# Patient Record
Sex: FEMALE | Race: WHITE | NOT HISPANIC OR LATINO | Employment: PART TIME | URBAN - METROPOLITAN AREA
[De-identification: names, ages, dates, MRNs, and addresses within clinical notes are randomized per-mention and may not be internally consistent; named-entity substitution may affect disease eponyms.]

---

## 2018-01-24 ENCOUNTER — OFFICE VISIT CONVERTED (OUTPATIENT)
Dept: ORTHOPEDIC SURGERY | Facility: CLINIC | Age: 53
End: 2018-01-24
Attending: PHYSICIAN ASSISTANT

## 2018-02-28 ENCOUNTER — OFFICE VISIT CONVERTED (OUTPATIENT)
Dept: ORTHOPEDIC SURGERY | Facility: CLINIC | Age: 53
End: 2018-02-28
Attending: PHYSICIAN ASSISTANT

## 2018-04-25 ENCOUNTER — OFFICE VISIT CONVERTED (OUTPATIENT)
Dept: ORTHOPEDIC SURGERY | Facility: CLINIC | Age: 53
End: 2018-04-25
Attending: PHYSICIAN ASSISTANT

## 2018-06-04 ENCOUNTER — OFFICE VISIT CONVERTED (OUTPATIENT)
Dept: ORTHOPEDIC SURGERY | Facility: CLINIC | Age: 53
End: 2018-06-04
Attending: ORTHOPAEDIC SURGERY

## 2018-07-27 ENCOUNTER — OFFICE VISIT CONVERTED (OUTPATIENT)
Dept: ORTHOPEDIC SURGERY | Facility: CLINIC | Age: 53
End: 2018-07-27
Attending: PHYSICIAN ASSISTANT

## 2018-10-01 ENCOUNTER — OFFICE VISIT CONVERTED (OUTPATIENT)
Dept: ORTHOPEDIC SURGERY | Facility: CLINIC | Age: 53
End: 2018-10-01
Attending: PHYSICIAN ASSISTANT

## 2020-03-26 ENCOUNTER — HOSPITAL ENCOUNTER (OUTPATIENT)
Dept: GENERAL RADIOLOGY | Facility: HOSPITAL | Age: 55
Discharge: HOME | End: 2020-03-26
Attending: NURSE PRACTITIONER

## 2020-12-23 ENCOUNTER — OFFICE VISIT CONVERTED (OUTPATIENT)
Dept: ORTHOPEDIC SURGERY | Facility: CLINIC | Age: 55
End: 2020-12-23
Attending: PHYSICIAN ASSISTANT

## 2021-01-20 ENCOUNTER — OFFICE VISIT CONVERTED (OUTPATIENT)
Dept: ORTHOPEDIC SURGERY | Facility: CLINIC | Age: 56
End: 2021-01-20
Attending: ORTHOPAEDIC SURGERY

## 2021-02-13 ENCOUNTER — HOSPITAL ENCOUNTER (OUTPATIENT)
Dept: PREADMISSION TESTING | Facility: HOSPITAL | Age: 56
Discharge: HOME | End: 2021-02-13
Attending: ORTHOPAEDIC SURGERY

## 2021-02-14 LAB — SARS-COV-2 RNA SPEC QL NAA+PROBE: DETECTED

## 2021-03-11 ENCOUNTER — HOSPITAL ENCOUNTER (OUTPATIENT)
Dept: PERIOP | Facility: HOSPITAL | Age: 56
Setting detail: HOSPITAL OUTPATIENT SURGERY
Discharge: HOME | End: 2021-03-11
Attending: ORTHOPAEDIC SURGERY

## 2021-03-11 LAB
GLUCOSE BLD-MCNC: 100 MG/DL (ref 65–99)
GLUCOSE BLD-MCNC: 121 MG/DL (ref 65–99)

## 2021-03-24 ENCOUNTER — OFFICE VISIT CONVERTED (OUTPATIENT)
Dept: ORTHOPEDIC SURGERY | Facility: CLINIC | Age: 56
End: 2021-03-24

## 2021-04-26 ENCOUNTER — OFFICE VISIT CONVERTED (OUTPATIENT)
Dept: ORTHOPEDIC SURGERY | Facility: CLINIC | Age: 56
End: 2021-04-26
Attending: PHYSICIAN ASSISTANT

## 2021-05-10 NOTE — H&P
History and Physical      Patient Name: Ann Marie Bunch   Patient ID: [id number]   Sex: Female   YOB: 1965        Visit Date: December 23, 2020    Provider: Claire Zuñiga PA-C   Location: [facility]   Location Address: [address] [place] [address]  [id number]   Location Phone: [phone]          Chief Complaint  · Right knee pain      History Of Present Illness  Ann Marie Bunch is a 55 year old /White female who presents today to [facility].      Patient is here for low back pain, right lower leg posterior pain. Patient states pain in the posterior aspect of the knee radiating to the right calf. Patient states pain started 3-4 weeks ago. Patient states pain is severe at times. Patient states pain with putting pressure on right leg. Patient denies recent injury or trauma.       Past Medical History  Aftercare following right shoulder arthroscopy; Asthma; Chronic Obstructive Pulmonary Disease; Diabetes         Past Surgical History  Back; Tonsillectomy         Medication List  Advair Diskus 100-50 mcg/dose inhalation blister with device; baclofen 10 mg oral tablet; loratadine 10 mg oral tablet; methscopolamine 2.5 mg oral tablet; Sudafed 12 Hour 120 mg oral tablet extended release         Allergy List  NSAIDS         Family Medical History  Cancer, Unspecified; Diabetes, unspecified type; Family history of certain chronic disabling diseases; arthritis         Social History  Alcohol Use (Current some day); .; lives alone; Recreational Drug Use (Never); Tobacco (Former); Working         Review of Systems  · Constitutional  o Denies  o : fever, chills, weight loss  · Cardiovascular  o Denies  o : chest pain, shortness of breath  · Gastrointestinal  o Denies  o : liver disease, heartburn, nausea, blood in stools  · Genitourinary  o Denies  o : painful urination, blood in urine  · Integument  o Denies  o : rash, itching  · Neurologic  o Denies  o : headache,  "weakness, loss of consciousness  · Musculoskeletal  o Denies  o : painful, swollen joints  · Psychiatric  o Denies  o : drug/alcohol addiction, anxiety, depression      Vitals  Date Time BP Position Site L\R Cuff Size HR RR TEMP (F) WT  HT  BMI kg/m2 BSA m2 O2 Sat FR L/min FiO2        12/23/2020 09:58 AM      89 - R   285lbs 0oz 5'  3\" 50.48 2.4 95 %            Physical Examination  · Constitutional  o Appearance  o : well developed, well-nourished, no obvious deformities present  · Head and Face  o Head  o :   § Inspection  § : normocephalic  o Face  o :   § Inspection  § : no facial lesions  · Eyes  o Conjunctivae  o : conjunctivae normal  o Sclerae  o : sclerae white  · Ears, Nose, Mouth and Throat  o Ears  o :   § External Ears  § : appearance within normal limits  § Hearing  § : intact  o Nose  o :   § External Nose  § : appearance normal  · Neck  o Inspection/Palpation  o : normal appearance  o Range of Motion  o : full range of motion  · Respiratory  o Respiratory Effort  o : breathing unlabored  o Inspection of Chest  o : normal appearance  o Auscultation of Lungs  o : no audible wheezing or rales  · Cardiovascular  o Heart  o : regular rate  · Gastrointestinal  o Abdominal Examination  o : soft and non-tender  · Skin and Subcutaneous Tissue  o General Inspection  o : intact, no rashes  · Psychiatric  o General  o : Alert and oriented x3  o Judgement and Insight  o : judgment and insight intact  o Mood and Affect  o : mood normal, affect appropriate  · Back  o Inspection  o : Pain distribution of L4-L5  · Injection Note/Aspiration Note  o Site  o : IM  o Procedure  o : Procedure: After educating the patient, patient gave consent for the procedure. After using alcohol prep, injection was given. The patient tolerated the procedure well.   o Medication  o : 2ml's of 4 mg Dexamethasone  · In Office Procedures  o View  o : LAT/SUNRISE/STANDING  o Site  o : right, knee  o Indication  o : Right knee " pain  o Study  o : X-rays ordered, taken in the office, and reviewed today.  o Xray  o : negative fracture or dislocation   o Comparative Data  o : No comparative data found  · Right Knee-Street  o Inspection  o : no limping gait, weight bearing, no swelling, no ecchymosis, no atrophy, neutral alignment  o Palpation  o : no medial joint line tenderness, no lateral joint line tenderness, no patellar tendon tenderness, no pain of MCL, no pain at LCL  o ROM  o : full extension, full flexion  o Strength  o : full extension, full flexion  o Neurovascular  o : Full sensation, Dorsal Pedal Pulse 2+, posteriror tibialis pulse 2+          Assessment  · Low Back Pain     724.2/M54.5  · Right knee pain, unspecified chronicity     719.46/M25.561      Plan  · Orders  o 2.00 - Dexamethasone Injection 8mg (-4) - 719.46/M25.561 - 12/23/2020   Lot [id number] Exp 01 2021 [facility] Administered by [employer]  o IM - Injection Fee [facility] ([id number]) - 719.46/M25.561 - 12/23/2020   Administered by [employer] MA  o Knee (Right) [facility] ([id number]) - 719.46/M25.561 - 12/23/2020  · Medications  o Medications have been Reconciled  o Transition of Care or Provider Policy  · Instructions  o Reviewed the patient's Past Medical, Social, and Family history as well as the ROS at today's visit, no changes.  o Call or return if worsening symptoms.  o Electronically Identified Patient Education Materials Provided Electronically     Recommend following up with Neurosurgeon               Electronically Signed by: RICCARDO Land-WASHINGTON -Author on December 23, 2020 10:33:16 AM  Electronically Co-signed by: Kun Clark MD -Reviewer on December 24, 2020 08:29:18 PM

## 2021-05-14 VITALS — OXYGEN SATURATION: 96 % | BODY MASS INDEX: 49.26 KG/M2 | WEIGHT: 278 LBS | HEIGHT: 63 IN | HEART RATE: 81 BPM

## 2021-05-14 VITALS — WEIGHT: 285 LBS | HEIGHT: 63 IN | HEART RATE: 89 BPM | BODY MASS INDEX: 50.5 KG/M2 | OXYGEN SATURATION: 95 %

## 2021-05-14 VITALS — HEIGHT: 63 IN | BODY MASS INDEX: 49.61 KG/M2 | WEIGHT: 280 LBS

## 2021-05-14 VITALS — BODY MASS INDEX: 48.93 KG/M2 | OXYGEN SATURATION: 96 % | HEART RATE: 79 BPM | WEIGHT: 276.12 LBS | HEIGHT: 63 IN

## 2021-05-14 NOTE — PROGRESS NOTES
Progress Note      Patient Name: Ann Marie Bunch   Patient ID: [id number]   Sex: Female   YOB: 1965        Visit Date: March 24, 2021    Provider: Denzel Monroy PA-C   Location: [facility]   Location Address: [address] [place] [address]  [id number]   Location Phone: [phone]          Chief Complaint  · Right knee pain      History Of Present Illness  Ann Marie Bunch is a 56 year old /White female who presents today to [facility].      Patient follows up today for right knee arthroscopy with partial medial meniscectomy chondroplasty and removal of multiple loose bodies performed 3/11/2021 by Dr. Narayan.  Patient reports pain has improved since the time of surgery.  Patient has been adherent to interventions and instructions.       Past Medical History  Aftercare following right shoulder arthroscopy; Asthma; Chronic Obstructive Pulmonary Disease; Diabetes; Limb Swelling         Past Surgical History  Back; Joint Surgery; Tonsillectomy         Medication List  Advair Diskus 100-50 mcg/dose inhalation blister with device; loratadine 10 mg oral tablet; methscopolamine 2.5 mg oral tablet; Sudafed 12 Hour 120 mg oral tablet extended release         Allergy List  NSAIDS         Family Medical History  Cancer, Unspecified; Diabetes, unspecified type; Family history of certain chronic disabling diseases; arthritis         Social History  Alcohol Use (Current some day); .; lives alone; Recreational Drug Use (Never); Tobacco (Former); Working         Review of Systems  · Constitutional  o Denies  o : fever, chills, weight loss  · Cardiovascular  o Denies  o : chest pain, shortness of breath  · Gastrointestinal  o Denies  o : liver disease, heartburn, nausea, blood in stools  · Genitourinary  o Denies  o : painful urination, blood in urine  · Integument  o Denies  o : rash, itching  · Neurologic  o Denies  o : headache, weakness, loss of  "consciousness  · Musculoskeletal  o Denies  o : painful, swollen joints  · Psychiatric  o Denies  o : drug/alcohol addiction, anxiety, depression      Vitals  Date Time BP Position Site L\R Cuff Size HR RR TEMP (F) WT  HT  BMI kg/m2 BSA m2 O2 Sat FR L/min FiO2 HC       03/24/2021 10:18 AM      79 - R   276lbs 2oz 5'  3\" 48.91 2.36 96 %            Physical Examination  · Constitutional  o Appearance  o : well developed, well-nourished, no obvious deformities present  · Head and Face  o Head  o :   § Inspection  § : normocephalic  o Face  o :   § Inspection  § : no facial lesions  · Eyes  o Conjunctivae  o : conjunctivae normal  o Sclerae  o : sclerae white  · Ears, Nose, Mouth and Throat  o Ears  o :   § External Ears  § : appearance within normal limits  § Hearing  § : intact  o Nose  o :   § External Nose  § : appearance normal  · Neck  o Inspection/Palpation  o : normal appearance  o Range of Motion  o : full range of motion  · Respiratory  o Respiratory Effort  o : breathing unlabored  o Inspection of Chest  o : normal appearance  o Auscultation of Lungs  o : no audible wheezing or rales  · Cardiovascular  o Heart  o : regular rate  · Gastrointestinal  o Abdominal Examination  o : soft and non-tender  · Skin and Subcutaneous Tissue  o General Inspection  o : intact, no rashes  · Psychiatric  o General  o : Alert and oriented x3  o Judgement and Insight  o : judgment and insight intact  o Mood and Affect  o : mood normal, affect appropriate  · Right Knee  o Inspection  o : Surgical sites appreciated to be healing appropriately and are clean dry and intact. Range of motion is moderately decreased as expected. 5 degrees extension to 100 degrees flexion. Antalgic and stable gait favoring right.          Assessment  · Right knee pain, unspecified chronicity     719.46/M25.561  · Surgical aftercare, musculoskeletal system     V58.78/Z47.89  · Decreased range of motion (ROM) of right " knee     719.56/M25.661      Plan  · Instructions  o Reviewed the patient's Past Medical, Social, and Family history as well as the ROS at today's visit, no changes.  o Call or return if worsening symptoms.  o Patient is 2 weeks post op. Sutures/staples removed. Should continue physical therapy and is to follow up 4 weeks.  o . Portions of this note were generated with voice recognition software. While efforts have been made to proofread the text, some sound alike errors may still persist.             Electronically Signed by: RICCARDO Valverde-WASHINGTON -Author on March 24, 2021 01:02:54 PM  Electronically Co-signed by: Kun Clark MD -Reviewer on March 24, 2021 09:18:47 PM

## 2021-05-14 NOTE — PROGRESS NOTES
Progress Note      Patient Name: Ann Marie Bunch   Patient ID: [id number]   Sex: Female   YOB: 1965        Visit Date: January 20, 2021    Provider: Kun Clark MD   Location: [facility]   Location Address: [address] [place] [address]  [id number]   Location Phone: [phone]          Chief Complaint  · Right Knee Pain      History Of Present Illness  Ann Marie Bunch is a 55 year old /White female who presents today to [facility].      Patient presents today with a follow-up of right knee pain. Patient states she has posterior knee pain that is radiating into her calf. She states pain has been ongoing for several weeks. She states pain can be severe at times. She received an injection last visit by one of our PAs that gave her minimal relief. Any pressure on her right knee causes increasing pain. Patient denies any trauma or injury. She states she does have some swelling on the posterior aspect of her knee and into her calf. Patient states that she is currently attending physical therapy. Patient presents today with MRI results of her right knee.       Past Medical History  Aftercare following right shoulder arthroscopy; Asthma; Chronic Obstructive Pulmonary Disease; Diabetes; Limb Swelling         Past Surgical History  Back; Joint Surgery; Tonsillectomy         Medication List  Advair Diskus 100-50 mcg/dose inhalation blister with device; loratadine 10 mg oral tablet; methscopolamine 2.5 mg oral tablet; Sudafed 12 Hour 120 mg oral tablet extended release         Allergy List  NSAIDS       Allergies Reconciled  Family Medical History  Cancer, Unspecified; Diabetes, unspecified type; Family history of certain chronic disabling diseases; arthritis         Social History  Alcohol Use (Current some day); .; lives alone; Recreational Drug Use (Never); Tobacco (Former); Working         Review of Systems  · Constitutional  o Denies  o : fever, chills, weight  "loss  · Cardiovascular  o Denies  o : chest pain, shortness of breath  · Gastrointestinal  o Denies  o : liver disease, heartburn, nausea, blood in stools  · Genitourinary  o Denies  o : painful urination, blood in urine  · Integument  o Denies  o : rash, itching  · Neurologic  o Denies  o : headache, weakness, loss of consciousness  · Musculoskeletal  o Denies  o : painful, swollen joints  · Psychiatric  o Denies  o : drug/alcohol addiction, anxiety, depression      Vitals  Date Time BP Position Site L\R Cuff Size HR RR TEMP (F) WT  HT  BMI kg/m2 BSA m2 O2 Sat FR L/min FiO2 HC       01/20/2021 10:24 AM      81 - R   278lbs 0oz 5'  3\" 49.24 2.37 96 %            Physical Examination  · Constitutional  o Appearance  o : well developed, well-nourished, no obvious deformities present  · Head and Face  o Head  o :   § Inspection  § : normocephalic  o Face  o :   § Inspection  § : no facial lesions  · Eyes  o Conjunctivae  o : conjunctivae normal  o Sclerae  o : sclerae white  · Ears, Nose, Mouth and Throat  o Ears  o :   § External Ears  § : appearance within normal limits  § Hearing  § : intact  o Nose  o :   § External Nose  § : appearance normal  · Neck  o Inspection/Palpation  o : normal appearance  o Range of Motion  o : full range of motion  · Respiratory  o Respiratory Effort  o : breathing unlabored  o Inspection of Chest  o : normal appearance  o Auscultation of Lungs  o : no audible wheezing or rales  · Cardiovascular  o Heart  o : regular rate  · Gastrointestinal  o Abdominal Examination  o : soft and non-tender  · Skin and Subcutaneous Tissue  o General Inspection  o : intact, no rashes  · Psychiatric  o General  o : Alert and oriented x3  o Judgement and Insight  o : judgment and insight intact  o Mood and Affect  o : mood normal, affect appropriate  · Right Knee  o Inspection  o : Sensation grossly intact. Neurovascular intact. Skin intact. Calf supple, non-tender. Patella tendon tenderness. Pain with " flexion. Tender medial joint line. Non-tender lateral joint line. Flexion to 90 degrees secondary to pain. Near full extension. No swelling, skin discoloration or atrophy. Pain with Apley's. Hamstring tenderness. Full weight bearing. Limping gait.   · Imaging  o Imaging  o : 1/5/21 MRI: 1. Findings most suspicious for a tear at the root of the posterior horn medial meniscus, or less likely extensive intrasubstance degenerative signal. There is also small region of marrow edema in the adjacent posterior medial tibial plateau suggesting an area of bone contusion. Intrasubstance degenerative changes are also noted in the remainder of the medial meniscus with fraying or surface tearing along the free edge of the body. 2. Moderate patellofemoeral joint degenerative changes and chondromalcia. There is mild medial compartment degenerative changes. 3. Thickening and mild signal changes in the proximal medial collateral ligament suggesting a mild sprain with no evidence of disruption. 4. Small joint effusion.           Assessment  · Right Knee MMT (medial meniscus tear)     836.0/S83.249A  · Right knee pain, unspecified chronicity     719.46/M25.561      Plan  · Medications  o Medications have been Reconciled  o Transition of Care or Provider Policy  · Instructions  o Dr. Clark saw and examined the patient and agrees with plan.   o MRI reviewed by Dr. Clark.  o Reviewed the patient's Past Medical, Social, and Family history as well as the ROS at today's visit, no changes.  o Call or return if worsening symptoms.  o Discussed surgery.  o Risks/benefits discussed with patient including, but not limited to: infection, bleeding, neurovascular damage, malunion, nonunion, aesthetic deformity, need for further surgery, and death.  o Surgery pamphlet given.  o Follow Up Post-Op.  o This note was transcribed by Charmaine Azevedo. fabi  o Discussed diagnosis and treatment options with the patient. Patient wishes to proceed with a right knee  scope.  o Electronically Identified Patient Education Materials Provided Electronically            Electronically Signed by: Charmaine Azevedo-Beatrice, Other -Author on January 21, 2021 02:33:58 PM  Electronically Co-signed by: Kun Clark MD -Reviewer on January 24, 2021 07:38:42 PM

## 2021-05-16 VITALS — OXYGEN SATURATION: 97 % | HEIGHT: 63 IN | WEIGHT: 276.12 LBS | HEART RATE: 81 BPM | BODY MASS INDEX: 48.93 KG/M2

## 2021-05-16 VITALS — HEIGHT: 63 IN | BODY MASS INDEX: 49.61 KG/M2 | WEIGHT: 280 LBS | OXYGEN SATURATION: 97 % | HEART RATE: 88 BPM

## 2021-05-16 VITALS — HEIGHT: 63 IN | BODY MASS INDEX: 48.21 KG/M2 | OXYGEN SATURATION: 98 % | WEIGHT: 272.12 LBS | HEART RATE: 100 BPM

## 2021-05-16 VITALS — HEART RATE: 67 BPM | WEIGHT: 280 LBS | OXYGEN SATURATION: 96 % | BODY MASS INDEX: 49.61 KG/M2 | HEIGHT: 63 IN

## 2021-05-16 VITALS — WEIGHT: 265 LBS | HEART RATE: 84 BPM | BODY MASS INDEX: 46.95 KG/M2 | HEIGHT: 63 IN | OXYGEN SATURATION: 97 %

## 2021-05-16 VITALS — HEIGHT: 63 IN | WEIGHT: 276 LBS | BODY MASS INDEX: 48.9 KG/M2 | OXYGEN SATURATION: 97 % | HEART RATE: 76 BPM

## 2021-06-07 ENCOUNTER — OFFICE VISIT (OUTPATIENT)
Dept: ORTHOPEDIC SURGERY | Facility: CLINIC | Age: 56
End: 2021-06-07

## 2021-06-07 VITALS — BODY MASS INDEX: 48.73 KG/M2 | HEART RATE: 82 BPM | HEIGHT: 63 IN | WEIGHT: 275 LBS | OXYGEN SATURATION: 96 %

## 2021-06-07 DIAGNOSIS — Z98.890 S/P ARTHROSCOPY OF RIGHT KNEE: Primary | ICD-10-CM

## 2021-06-07 PROCEDURE — 99024 POSTOP FOLLOW-UP VISIT: CPT | Performed by: PHYSICIAN ASSISTANT

## 2021-06-07 RX ORDER — BENZONATATE 200 MG/1
200 CAPSULE ORAL
COMMUNITY
Start: 2020-12-11

## 2021-06-07 RX ORDER — METHSCOPOLAMINE BROMIDE 2.5 MG/1
TABLET ORAL
COMMUNITY
Start: 2020-12-11

## 2021-06-07 RX ORDER — ALBUTEROL SULFATE 90 UG/1
2 AEROSOL, METERED RESPIRATORY (INHALATION)
COMMUNITY

## 2021-06-07 RX ORDER — LORATADINE 10 MG/1
TABLET ORAL
COMMUNITY

## 2021-06-07 RX ORDER — BACLOFEN 10 MG/1
TABLET ORAL
COMMUNITY
Start: 2021-05-10

## 2021-06-07 NOTE — PATIENT INSTRUCTIONS
Patient given normal knee brace today to wear during work.   I advise continuing PT to improve muscular pain and stiffness.   Recommend RICE therapy for swelling.   Follow up in 6 weeks to reevaluate

## 2021-06-07 NOTE — PROGRESS NOTES
"Chief Complaint  Pain of the Right Knee    Subjective          Ann Marie Bunch presents to [facility]  Patient presents today for 6-week follow-up for right knee arthroscopy with partial medial meniscectomy, chondroplasty, and removal of multiple loose bodies performed on March 11, 2021 by Dr. Clark.  Patient states she is overall making progress in PT but still reports having pain and swelling.  She returned to work on May 10th and reports being on her feet a lot throughout the day.  She is unable to take NSAIDs, but has been taking Tylenol for her pain.       Objective   Vital Signs:   Pulse 82   Ht 160 cm (63\")   Wt 125 kg (275 lb)   SpO2 96%   BMI 48.71 kg/m²     Physical Exam   Result Review :           Right Knee: Mild swelling across the joint.  Tenderness at the insertion of the quadriceps.  Tenderness of the hamstring tendons. No atrophy or discoloration.  Active range of motion is -3 to 120 degrees.  Good muscle tone of the quadriceps and hamstrings.  Calf is supple and nontender.  Sensation is intact, neurovascular intact.     Assessment and Plan    Diagnoses and all orders for this visit:    1. S/P arthroscopy of right knee (Primary)        Follow Up   Return in about 6 weeks (around 7/19/2021) for Recheck.  Patient was given instructions and counseling regarding her condition or for health maintenance advice. Please see specific information pulled into the AVS if appropriate.   Patient given normal knee brace today to wear during work.   I advise continuing PT to improve muscular pain and stiffness.   Recommend RICE therapy for swelling.   Follow up in 6 weeks to reevaluate      "

## 2021-07-16 ENCOUNTER — HOSPITAL ENCOUNTER (EMERGENCY)
Facility: HOSPITAL | Age: 56
Discharge: HOME | End: 2021-07-16
Attending: EMERGENCY MEDICINE | Admitting: EMERGENCY MEDICINE

## 2021-07-16 VITALS
OXYGEN SATURATION: 97 % | WEIGHT: 265.88 LBS | HEIGHT: 63 IN | BODY MASS INDEX: 47.11 KG/M2 | SYSTOLIC BLOOD PRESSURE: 125 MMHG | RESPIRATION RATE: 20 BRPM | DIASTOLIC BLOOD PRESSURE: 65 MMHG | HEART RATE: 77 BPM | TEMPERATURE: 98.3 F

## 2021-07-16 DIAGNOSIS — H57.9 RIGHT EYE SYMPTOMS: Primary | ICD-10-CM

## 2021-07-16 DIAGNOSIS — B02.39 SHINGLES OF EYELID: ICD-10-CM

## 2021-07-16 PROCEDURE — 99283 EMERGENCY DEPT VISIT LOW MDM: CPT

## 2021-07-16 RX ORDER — PSEUDOEPHEDRINE HCL 120 MG/1
120 TABLET, FILM COATED, EXTENDED RELEASE ORAL EVERY 12 HOURS
COMMUNITY
Start: 2021-06-24

## 2021-07-16 RX ORDER — ACYCLOVIR 400 MG/1
400 TABLET ORAL
Qty: 35 TABLET | Refills: 0 | Status: SHIPPED | OUTPATIENT
Start: 2021-07-16

## 2021-07-16 RX ORDER — PROPARACAINE HYDROCHLORIDE 5 MG/ML
2 SOLUTION/ DROPS OPHTHALMIC ONCE
Status: COMPLETED | OUTPATIENT
Start: 2021-07-16 | End: 2021-07-16

## 2021-07-16 RX ADMIN — PROPARACAINE HYDROCHLORIDE 2 DROP: 5 SOLUTION/ DROPS OPHTHALMIC at 02:30

## 2021-07-16 NOTE — DISCHARGE INSTRUCTIONS
Follow up with your ophthalmologist/eye care provider for further evaluation. Use over the counter eye drops that have been refrigerated for comfort and lubrication of the eye.

## 2021-07-16 NOTE — ED PROVIDER NOTES
Subjective   Pt with right eye pain and burning with redness to right cheek and face that started today. Denies fever. Denies vision changes, denies injury.       History provided by:  Patient      Review of Systems   Constitutional: Negative for chills and fever.   HENT: Negative for congestion, ear pain and sore throat.    Eyes: Positive for pain and itching. Negative for photophobia, discharge and visual disturbance.   Respiratory: Negative for cough, chest tightness and shortness of breath.    Cardiovascular: Negative for chest pain.   Gastrointestinal: Negative for abdominal pain, diarrhea, nausea and vomiting.   Genitourinary: Negative for flank pain and hematuria.   Musculoskeletal: Negative for joint swelling.   Skin: Negative for pallor.   Neurological: Negative for seizures and headaches.   All other systems reviewed and are negative.      Past Medical History:   Diagnosis Date   • Asthma    • Chronic obstructive pulmonary disease (CMS/[facility]ly [facility])    • Diabetes (CMS/[facility])    • H/O shoulder surgery 08/31/2017    aftercare following right shoulder arthroscopy    • Limb swelling        Allergies   Allergen Reactions   • Motrin [Ibuprofen] Swelling   • Nsaids Swelling and GI Intolerance       Past Surgical History:   Procedure Laterality Date   • BACK SURGERY     • KNEE SURGERY     • OTHER SURGICAL HISTORY      joint surgery    • SHOULDER ARTHROSCOPY     • TONSILLECTOMY         Family History   Problem Relation Age of Onset   • Arthritis Mother         family history of certain chronic disabling diseases    • Cancer Father         unspecified   • Diabetes Father         unspecified type   • Cancer Sister         unspecified   • Diabetes Sister         unspecified type       Social History     Socioeconomic History   • Marital status:      Spouse name: Not on file   • Number of children: Not on file   • Years of education: Not on file   • Highest education level: Not on file   Tobacco Use   • Smoking status:  Former Smoker   • Smokeless tobacco: Never Used   • Tobacco comment: 10/20/2017   Vaping Use   • Vaping Use: Never assessed   Substance and Sexual Activity   • Alcohol use: Yes     Comment: occasionally drinks    • Drug use: Never   • Sexual activity: Defer           Objective   Physical Exam  Vitals and nursing note reviewed.   Constitutional:       General: She is not in acute distress.     Appearance: Normal appearance. She is not toxic-appearing.   HENT:      Head: Normocephalic and atraumatic.      Mouth/Throat:      Mouth: Mucous membranes are moist.   Eyes:      General: Lids are everted, no foreign bodies appreciated. Vision grossly intact. Gaze aligned appropriately.         Right eye: No discharge or hordeolum.         Left eye: No discharge or hordeolum.      Extraocular Movements: Extraocular movements intact.      Pupils: Pupils are equal, round, and reactive to light.      Right eye: No corneal abrasion or fluorescein uptake. Bowen exam negative.   Cardiovascular:      Rate and Rhythm: Normal rate and regular rhythm.      Pulses: Normal pulses.      Heart sounds: Normal heart sounds.   Pulmonary:      Effort: Pulmonary effort is normal. No respiratory distress.      Breath sounds: Normal breath sounds.   Abdominal:      General: Abdomen is flat.      Palpations: Abdomen is soft.      Tenderness: There is no abdominal tenderness.   Musculoskeletal:         General: Normal range of motion.      Cervical back: Normal range of motion and neck supple.   Skin:     General: Skin is warm and dry.   Neurological:      Mental Status: She is alert and oriented to person, place, and time. Mental status is at baseline.         Procedures           ED Course                                           MDM  Number of Diagnoses or Management Options  Right eye symptoms: new and requires workup  Shingles of eyelid: new and requires workup  Diagnosis management comments: Pt with itching and burning to right eye with  redness that may be beginning of shingles without vesicles. Will treat with Acyclovir and recommended follow up with her eye care provider for further evaluation. Stable for discharge.     Risk of Complications, Morbidity, and/or Mortality  Presenting problems: low  Diagnostic procedures: minimal  Management options: minimal    Patient Progress  Patient progress: stable      Final diagnoses:   Right eye symptoms   Shingles of eyelid       ED Disposition  ED Disposition     ED Disposition Condition Comment    Discharge Stable           Heike Buchananas, APRN  [address] [facility]  [address]  [facility] [address]  [phone]    In 2 days  As needed         Medication List      New Prescriptions    acyclovir 400 MG tablet  Commonly known as: ZOVIRAX  Take 1 tablet by mouth 5 (Five) Times a Day. Take no more than 5 doses a day.           Where to Get Your Medications      These medications were sent to [id number] - [place], [place] - [address] AT [facility] - [phone]  - [phone] FX  [address] [place] [address]    Hours: 24-hours Phone: [phone]   · acyclovir 400 MG tablet          Jeanmarie Mariee, APRN  07/16/21 0719

## 2021-07-18 ENCOUNTER — HOSPITAL ENCOUNTER (EMERGENCY)
Facility: HOSPITAL | Age: 56
Discharge: HOME | End: 2021-07-18
Attending: EMERGENCY MEDICINE | Admitting: EMERGENCY MEDICINE

## 2021-07-18 VITALS
HEART RATE: 80 BPM | BODY MASS INDEX: 48.05 KG/M2 | SYSTOLIC BLOOD PRESSURE: 138 MMHG | OXYGEN SATURATION: 98 % | WEIGHT: 271.17 LBS | TEMPERATURE: 98.1 F | DIASTOLIC BLOOD PRESSURE: 73 MMHG | RESPIRATION RATE: 20 BRPM | HEIGHT: 63 IN

## 2021-07-18 DIAGNOSIS — L23.7 POISON IVY DERMATITIS: ICD-10-CM

## 2021-07-18 DIAGNOSIS — L25.9 CONTACT DERMATITIS AND ECZEMA: Primary | ICD-10-CM

## 2021-07-18 PROCEDURE — 25010000002 METHYLPREDNISOLONE PER 125 MG: Performed by: NURSE PRACTITIONER

## 2021-07-18 PROCEDURE — 99283 EMERGENCY DEPT VISIT LOW MDM: CPT

## 2021-07-18 PROCEDURE — 96372 THER/PROPH/DIAG INJ SC/IM: CPT

## 2021-07-18 RX ORDER — METHYLPREDNISOLONE SODIUM SUCCINATE 125 MG/2ML
125 INJECTION, POWDER, LYOPHILIZED, FOR SOLUTION INTRAMUSCULAR; INTRAVENOUS ONCE
Status: COMPLETED | OUTPATIENT
Start: 2021-07-18 | End: 2021-07-18

## 2021-07-18 RX ORDER — PREDNISONE 20 MG/1
40 TABLET ORAL DAILY
Qty: 10 TABLET | Refills: 0 | Status: SHIPPED | OUTPATIENT
Start: 2021-07-18

## 2021-07-18 RX ORDER — HYDROXYZINE HYDROCHLORIDE 25 MG/1
25 TABLET, FILM COATED ORAL 3 TIMES DAILY PRN
Status: DISCONTINUED | OUTPATIENT
Start: 2021-07-18 | End: 2021-07-18 | Stop reason: HOSPADM

## 2021-07-18 RX ORDER — HYDROXYZINE HYDROCHLORIDE 25 MG/1
25 TABLET, FILM COATED ORAL 3 TIMES DAILY PRN
Qty: 20 TABLET | Refills: 0 | Status: SHIPPED | OUTPATIENT
Start: 2021-07-18

## 2021-07-18 RX ADMIN — METHYLPREDNISOLONE SODIUM SUCCINATE 125 MG: 125 INJECTION, POWDER, FOR SOLUTION INTRAMUSCULAR; INTRAVENOUS at 19:03

## 2021-07-18 RX ADMIN — HYDROXYZINE HYDROCHLORIDE 25 MG: 25 TABLET, FILM COATED ORAL at 19:03

## 2021-07-19 ENCOUNTER — OFFICE VISIT (OUTPATIENT)
Dept: ORTHOPEDIC SURGERY | Facility: CLINIC | Age: 56
End: 2021-07-19

## 2021-07-19 VITALS — HEIGHT: 63 IN | HEART RATE: 71 BPM | OXYGEN SATURATION: 96 % | BODY MASS INDEX: 48.55 KG/M2 | WEIGHT: 274 LBS

## 2021-07-19 DIAGNOSIS — Z47.89 AFTERCARE FOLLOWING SURGERY OF THE MUSCULOSKELETAL SYSTEM: Primary | ICD-10-CM

## 2021-07-19 PROCEDURE — 99212 OFFICE O/P EST SF 10 MIN: CPT | Performed by: PHYSICIAN ASSISTANT

## 2021-07-19 NOTE — PATIENT INSTRUCTIONS
Continue with strengthening and ROM exercises at physical therapy.   Continue ice / elevation for associated swelling.   Follow up, should she regress or fail to improve.

## 2021-07-19 NOTE — PROGRESS NOTES
"Chief Complaint  Pain of the Right Knee    Subjective          Ann Marie Bunch presents to [facility] for s/p right knee arthroscopy with partial medial menisectomy, chondroplasty and removal of multiple loose bodies on 03/11/21 by Dr. Clark. Patient reports that her knee pain has improved, but does not feel it is where she needs to be. She states her pain is likely contributed to working two jobs and a recent move. She states her knee is doing really well today. She reports most of her problems being after she has been sitting for long periods and feels stiff upon standing. She is still doing PT twice weekly. She feels PT is helpful for her stiffness.     Objective   Vital Signs:   Pulse 71   Ht 160 cm (63\")   Wt 124 kg (274 lb)   SpO2 96%   BMI 48.54 kg/m²       Physical Exam  Constitutional:       Appearance: Normal appearance. He is well-developed and normal weight.   HENT:      Head: Normocephalic.      Right Ear: Hearing and external ear normal.      Left Ear: Hearing and external ear normal.      Nose: Nose normal.   Eyes:      Conjunctiva/sclera: Conjunctivae normal.   Cardiovascular:      Rate and Rhythm: Normal rate.   Pulmonary:      Effort: Pulmonary effort is normal.      Breath sounds: No wheezing or rales.   Abdominal:      Palpations: Abdomen is soft.      Tenderness: There is no abdominal tenderness.   Musculoskeletal:      Cervical back: Normal range of motion.   Skin:     Findings: No rash.   Neurological:      Mental Status: He is alert and oriented to person, place, and time.   Psychiatric:         Mood and Affect: Mood and affect normal.         Judgment: Judgment normal.     Ortho Exam  Right knee: Full weightbearing.  Mild limping gait.  Sensation is intact.  Neurovascular intact.  Calf is soft and nontender.  Negative Homans.  Well-healed scars.  Tenderness on the medial and lateral joint lines.  No atrophy or deformity.  Active range of motion of the knee is " 0 to 130 degrees of flexion.  Stable varus and valgus stress.  No pain with Lisa's.  Good muscle tone of the quadriceps and hamstrings muscles.  Normal plantar and dorsiflexion.     Result Review :            Imaging Results (Most Recent)     None                Assessment and Plan    Problem List Items Addressed This Visit        Musculoskeletal and Injuries    Aftercare following right knee arthroscopy with partial medial menisectomy, chondroplasty and removal of loose bodies - Primary          Follow Up   Return if symptoms worsen or fail to improve.  Patient Instructions   Continue with strengthening and ROM exercises at physical therapy.   Continue ice / elevation for associated swelling.   Follow up, should she regress or fail to improve.     Patient was given instructions and counseling regarding her condition or for health maintenance advice. Please see specific information pulled into the AVS if appropriate.

## 2021-09-15 ENCOUNTER — TRANSCRIBE ORDERS (OUTPATIENT)
Dept: ADMINISTRATIVE | Facility: HOSPITAL | Age: 56
End: 2021-09-15

## 2021-09-15 DIAGNOSIS — M54.50 LOW BACK PAIN, UNSPECIFIED BACK PAIN LATERALITY, UNSPECIFIED CHRONICITY, UNSPECIFIED WHETHER SCIATICA PRESENT: Primary | ICD-10-CM

## 2021-09-21 ENCOUNTER — APPOINTMENT (OUTPATIENT)
Dept: MRI IMAGING | Facility: HOSPITAL | Age: 56
End: 2021-09-21

## 2021-11-16 ENCOUNTER — OFFICE VISIT (OUTPATIENT)
Dept: NEUROSURGERY | Facility: CLINIC | Age: 56
End: 2021-11-16

## 2021-11-16 VITALS
HEIGHT: 63 IN | BODY MASS INDEX: 47.84 KG/M2 | WEIGHT: 270 LBS | SYSTOLIC BLOOD PRESSURE: 126 MMHG | DIASTOLIC BLOOD PRESSURE: 61 MMHG | HEART RATE: 68 BPM

## 2021-11-16 DIAGNOSIS — M51.27 HERNIATED NUCLEUS PULPOSUS, L5-S1, RIGHT: Primary | ICD-10-CM

## 2021-11-16 DIAGNOSIS — M47.816 FACET ARTHRITIS OF LUMBAR REGION: ICD-10-CM

## 2021-11-16 PROCEDURE — 99214 OFFICE O/P EST MOD 30 MIN: CPT | Performed by: PHYSICIAN ASSISTANT

## 2021-11-16 NOTE — PROGRESS NOTES
"Chief Complaint  Back Pain    Subjective          Ann Marie Bunch who is a 56 y.o. year old female who presents to [facility] for Evaluation of the Spine.     The patient complains of pain located in the Lumbar Spine.  Patients states the pain has been present for 2 years.  The pain came on gradually.  The pain scaled level is 7.  The pain does radiate. Dermatomes are located bilaterally Lumbar at: not below the knee..  The pain is Intermittent and described as pressure like.  The pain is worse at no particular time of day. Patient states Muscle Relaxants and Tylenol arthritis makes the pain better.  Patient states Prolonged Sitting and laying down makes the pain worse.    Associated Symptoms Include: Denies numbness, tingling, weakness, loss of bowel or bladder control.  Conservative Interventions Include: Epidural Steroids that were effective., Oral Steroids that were ineffective., Muscle Relaxants that were not very effective. and Tylenol Arthritis that were not very effective.    Was this the result of an injury or accident?: No    History of Previous Spinal Surgery?: Yes.  Lumbar, Date over 5 years ago, L4-L5 discectomy.     reports that she has quit smoking. She has never used smokeless tobacco.    Review of Systems   Musculoskeletal: Positive for back pain and myalgias.   Neurological: Positive for weakness and numbness.        Objective   Vital Signs:   /61   Pulse 68   Ht 160 cm (63\")   Wt 122 kg (270 lb)   BMI 47.83 kg/m²       Physical Exam  Constitutional:       Appearance: Normal appearance. She is obese.   Pulmonary:      Effort: Pulmonary effort is normal.   Musculoskeletal:         General: Tenderness (TTP midline lumbar) present.      Comments: SLR on right causes pain in lumbar spine   Neurological:      General: No focal deficit present.      Mental Status: She is alert and oriented to person, place, and time.      Sensory: Sensory deficit " (mildly reduced in right lateral thigh) present.      Motor: No weakness.      Deep Tendon Reflexes: Reflexes normal.   Psychiatric:         Mood and Affect: Mood normal.         Behavior: Behavior normal.        Neurologic Exam     Mental Status   Oriented to person, place, and time.        Result Review     I have personally reviewed the MRI of lumbar spine without contrast from 10/13/2021 which shows multilevel degenerative disc disease and facet arthropathy worse at L5-S1 where there is right paracentric disc bulge possibly abutting the S1 nerve root, there is bilateral neuroforaminal narrowing at this level worse on the right.       Assessment and Plan    Diagnoses and all orders for this visit:    1. Herniated nucleus pulposus, L5-S1, right (Primary)    2. Facet arthritis of lumbar region    Her pain is non-specific. I do not see that the risk of surgery would outweigh the benefit for her. If she develops pain in an S1 pattern on the right, then possibly targeting the S1 nerve at L5-S1 would be helpful, however.    She may consider PT to assess benefit for her pain.    I do recommend that she continue with epidural injections in the lumbar spine with her pain management provider in [place], as this has been helpful so far.    The patient was counseled on basic recommendations for the reduction and prevention of back, neck, or spine pain in association with spinal disorders, including: cessation/avoidance of nicotine use, maintenance of a healthy BMI and weight, focusing on building/maintaining core strength through core exercise, and avoidance of activities which worsen the pain. Surgery for patients with multilevel degenerative disc disease is not typically successful, with the risks outweighing the benefit. The patient will monitor for changes in symptoms and notify our clinic of these changes as needed.    She will follow-up here PRN for failure to improve or worsening symptoms.      Follow Up   Return  if symptoms worsen or fail to improve.  Patient was given instructions and counseling regarding her condition or for health maintenance advice. Please see specific information pulled into the AVS if appropriate.

## 2022-09-12 ENCOUNTER — OFFICE VISIT (OUTPATIENT)
Dept: ORTHOPEDIC SURGERY | Facility: CLINIC | Age: 57
End: 2022-09-12

## 2022-09-12 VITALS — OXYGEN SATURATION: 98 % | HEIGHT: 63 IN | HEART RATE: 84 BPM | BODY MASS INDEX: 46.85 KG/M2 | WEIGHT: 264.4 LBS

## 2022-09-12 DIAGNOSIS — M25.561 RIGHT KNEE PAIN, UNSPECIFIED CHRONICITY: Primary | ICD-10-CM

## 2022-09-12 DIAGNOSIS — M17.11 PRIMARY OSTEOARTHRITIS OF RIGHT KNEE: ICD-10-CM

## 2022-09-12 PROCEDURE — 20610 DRAIN/INJ JOINT/BURSA W/O US: CPT | Performed by: ORTHOPAEDIC SURGERY

## 2022-09-12 RX ORDER — LIDOCAINE HYDROCHLORIDE 10 MG/ML
5 INJECTION, SOLUTION INFILTRATION; PERINEURAL
Status: COMPLETED | OUTPATIENT
Start: 2022-09-12 | End: 2022-09-12

## 2022-09-12 RX ORDER — TRIAMCINOLONE ACETONIDE 40 MG/ML
40 INJECTION, SUSPENSION INTRA-ARTICULAR; INTRAMUSCULAR
Status: COMPLETED | OUTPATIENT
Start: 2022-09-12 | End: 2022-09-12

## 2022-09-12 RX ADMIN — TRIAMCINOLONE ACETONIDE 40 MG: 40 INJECTION, SUSPENSION INTRA-ARTICULAR; INTRAMUSCULAR at 09:47

## 2022-09-12 RX ADMIN — LIDOCAINE HYDROCHLORIDE 5 ML: 10 INJECTION, SOLUTION INFILTRATION; PERINEURAL at 09:47

## 2022-09-12 NOTE — PROGRESS NOTES
"Chief Complaint  Follow-up of the Right Knee     Subjective      Ann Marie Bunch presents to [facility] for a follow-up of right knee. Patient has a history of a right knee arthroscopy with partial medial menisectomy, chondroplasty and removal of multiple loose bodies on 03/11/21. This gave her relief for a short period of time. She states pain along the medial joint line. She denies any new injury or trauma. She works 2 jobs which is additional stress to her knees.     Allergies   Allergen Reactions   • Aspirin Nausea Only     STATES \"TEARS HER STOMACH UP\" EVEN WITH FOOD   • Motrin [Ibuprofen] Swelling   • Nsaids Swelling and GI Intolerance        Social History     Socioeconomic History   • Marital status:    Tobacco Use   • Smoking status: Former Smoker   • Smokeless tobacco: Never Used   • Tobacco comment: 10/20/2017   Substance and Sexual Activity   • Alcohol use: Yes     Comment: occasionally drinks    • Drug use: Never   • Sexual activity: Defer        Review of Systems     Objective   Vital Signs:   Pulse 84   Ht 160 cm (63\")   Wt 120 kg (264 lb 6.4 oz)   SpO2 98%   BMI 46.84 kg/m²       Physical Exam  Constitutional:       Appearance: Normal appearance. Patient is well-developed and normal weight.   HENT:      Head: Normocephalic.      Right Ear: Hearing and external ear normal.      Left Ear: Hearing and external ear normal.      Nose: Nose normal.   Eyes:      Conjunctiva/sclera: Conjunctivae normal.   Cardiovascular:      Rate and Rhythm: Normal rate.   Pulmonary:      Effort: Pulmonary effort is normal.      Breath sounds: No wheezing or rales.   Abdominal:      Palpations: Abdomen is soft.      Tenderness: There is no abdominal tenderness.   Musculoskeletal:      Cervical back: Normal range of motion.   Skin:     Findings: No rash.   Neurological:      Mental Status: Patient  is alert and oriented to person, place, and time.   Psychiatric:         Mood and " Affect: Mood and affect normal.         Judgment: Judgment normal.       Ortho Exam      RIGHT KNEE: Tender medial joint line. Calf soft. Sensation grossly intact. Neurovascular intact.  Good strength to hamstrings, quadriceps, dorsiflexors and plantar flexors. Stable to varus/valgus stress. Stable anterior and posterior drawer. Negative Lachman. Non-tender lateral joint line. Moderate swelling.       Right knee : R knee  Date/Time: 9/12/2022 9:47 AM  Consent given by: patient  Site marked: site marked  Timeout: Immediately prior to procedure a time out was called to verify the correct patient, procedure, equipment, support staff and site/side marked as required   Supporting Documentation  Indications: pain   Procedure Details  Location: knee - R knee  Needle size: 22 G  Medications administered: 5 mL lidocaine 1 %; 40 mg triamcinolone acetonide 40 MG/ML  Patient tolerance: patient tolerated the procedure well with no immediate complications              Imaging Results (Most Recent)     Procedure Component Value Units Date/Time    XR Knee 3 View Right [[id number]] Resulted: 09/12/22 0934     Updated: 09/12/22 0937           Result Review :     X-Ray Report:  Right knee(s) X-Ray  Indication: Evaluation of right knee pain  AP, Lateral and Standing view(s)  Findings: No acute fractures or dislocation. Joint space narrowing of the medial compartment. Mild to moderate osteoarthritis.   Prior studies available for comparison: no     Assessment and Plan     Diagnoses and all orders for this visit:    1. Right knee pain, unspecified chronicity (Primary)  -     XR Knee 3 View Right    2. Primary osteoarthritis of right knee        Patient encouraged to lose weight so that she can qualify for a total knee replacement in the future. She understands this.     Right knee steroid injection offered to the patient today, she wishes to proceed. Right knee steroid injection was administered, patient tolerated this procedure well. If  failing to improve with this, patient will consider trying viscosupplementation.     She is unable to take NSAIDs.     Call or return if worsening symptoms.    Follow Up     PRN.       Patient was given instructions and counseling regarding her condition or for health maintenance advice. Please see specific information pulled into the AVS if appropriate.     Scribed for Kun Clark MD by Charmaine Azevedo.  09/12/22   09:43 EDT    I have personally performed the services described in this document as scribed by the above individual and it is both accurate and complete. Kun Clark MD 09/12/22

## 2022-10-24 ENCOUNTER — OFFICE VISIT (OUTPATIENT)
Dept: ORTHOPEDIC SURGERY | Facility: CLINIC | Age: 57
End: 2022-10-24

## 2022-10-24 VITALS — HEIGHT: 63 IN | WEIGHT: 264 LBS | BODY MASS INDEX: 46.78 KG/M2

## 2022-10-24 DIAGNOSIS — M25.561 RIGHT KNEE PAIN, UNSPECIFIED CHRONICITY: Primary | ICD-10-CM

## 2022-10-24 DIAGNOSIS — M17.11 PRIMARY OSTEOARTHRITIS OF RIGHT KNEE: ICD-10-CM

## 2022-10-24 PROCEDURE — 99213 OFFICE O/P EST LOW 20 MIN: CPT | Performed by: ORTHOPAEDIC SURGERY

## 2022-10-24 RX ORDER — MONTELUKAST SODIUM 10 MG/1
TABLET ORAL
COMMUNITY
Start: 2022-09-08

## 2022-10-24 RX ORDER — CETIRIZINE HYDROCHLORIDE 10 MG/1
TABLET ORAL
COMMUNITY
Start: 2022-08-10

## 2022-10-24 RX ORDER — FAMOTIDINE 20 MG/1
TABLET, FILM COATED ORAL
COMMUNITY
Start: 2022-08-10

## 2022-10-24 NOTE — PROGRESS NOTES
"Chief Complaint  Follow-up of the Right Knee     Subjective      Ann Marie Bunch presents to [facility] for a follow-up of right knee. Previous injections have given relief but pain remains persistent in the right knee, along the medial joint line. She states pain with increased activity.     Allergies   Allergen Reactions   • Aspirin Nausea Only     STATES \"TEARS HER STOMACH UP\" EVEN WITH FOOD   • Motrin [Ibuprofen] Swelling   • Nsaids Swelling and GI Intolerance        Social History     Socioeconomic History   • Marital status:    Tobacco Use   • Smoking status: Former   • Smokeless tobacco: Never   • Tobacco comments:     10/20/2017   Vaping Use   • Vaping Use: Never used   Substance and Sexual Activity   • Alcohol use: Yes     Comment: occasionally drinks    • Drug use: Never   • Sexual activity: Defer        Review of Systems     Objective   Vital Signs:   Ht 160 cm (63\")   Wt 120 kg (264 lb)   BMI 46.77 kg/m²       Physical Exam  Constitutional:       Appearance: Normal appearance. Patient is well-developed and normal weight.   HENT:      Head: Normocephalic.      Right Ear: Hearing and external ear normal.      Left Ear: Hearing and external ear normal.      Nose: Nose normal.   Eyes:      Conjunctiva/sclera: Conjunctivae normal.   Cardiovascular:      Rate and Rhythm: Normal rate.   Pulmonary:      Effort: Pulmonary effort is normal.      Breath sounds: No wheezing or rales.   Abdominal:      Palpations: Abdomen is soft.      Tenderness: There is no abdominal tenderness.   Musculoskeletal:      Cervical back: Normal range of motion.   Skin:     Findings: No rash.   Neurological:      Mental Status: Patient  is alert and oriented to person, place, and time.   Psychiatric:         Mood and Affect: Mood and affect normal.         Judgment: Judgment normal.       Ortho Exam      RIGHT KNEE: Calf supple, non-tender, no signs of DVT. Tender medial joint line. Good strength " to hamstrings, quadriceps, dorsiflexors and plantar flexors. Stable to varus/valgus stress. Stable anterior and posterior drawer. Negative lachman. Mild effusion.       Procedures        Imaging Results (Most Recent)     None           Result Review :         No results found.           Assessment and Plan     Diagnoses and all orders for this visit:    1. Right knee pain, unspecified chronicity (Primary)    2. Primary osteoarthritis of right knee        Plan for visco injection approval.     Call or return if worsening symptoms.    Follow Up     After visco approval.       Patient was given instructions and counseling regarding her condition or for health maintenance advice. Please see specific information pulled into the AVS if appropriate.     Scribed for Kun Clark MD by Charmaine Azevedo.  10/24/22   09:21 EDT      I have personally performed the services described in this document as scribed by the above individual and it is both accurate and complete. Kun Clark MD 10/24/22

## 2022-11-09 ENCOUNTER — OFFICE VISIT (OUTPATIENT)
Dept: ORTHOPEDIC SURGERY | Facility: CLINIC | Age: 57
End: 2022-11-09

## 2022-11-09 VITALS — HEART RATE: 74 BPM | HEIGHT: 63 IN | OXYGEN SATURATION: 96 % | BODY MASS INDEX: 46.95 KG/M2 | WEIGHT: 265 LBS

## 2022-11-09 DIAGNOSIS — M17.11 PRIMARY OSTEOARTHRITIS OF RIGHT KNEE: Primary | ICD-10-CM

## 2022-11-09 PROCEDURE — 20610 DRAIN/INJ JOINT/BURSA W/O US: CPT | Performed by: PHYSICIAN ASSISTANT

## 2022-11-09 NOTE — PROGRESS NOTES
"Chief Complaint  Follow-up of the Right Knee    Subjective      Ann Marie Bunch presents to [facility] for follow-up of right knee.  Patient has history of right knee arthroscopy performed by Dr. Clark with partial medial meniscectomy, chondroplasty removal of multiple loose bodies on 03/11/2021.  She was recently approved for Synvisc 1 injection.  Steroid injections failed to provide her with relief.    Objective   Allergies   Allergen Reactions   • Aspirin Nausea Only     STATES \"TEARS HER STOMACH UP\" EVEN WITH FOOD   • Motrin [Ibuprofen] Swelling   • Nsaids Swelling and GI Intolerance       Vital Signs:   Pulse 74   Ht 160 cm (63\")   Wt 120 kg (265 lb)   SpO2 96%   BMI 46.94 kg/m²       Physical Exam    Constitutional: Awake, alert. Well nourished appearance.    Integumentary: Warm, dry, intact. No obvious rashes.    HENT: Atraumatic, normocephalic.   Respiratory: Non labored respirations .   Cardiovascular: Intact peripheral pulses.    Psychiatric: Normal mood and affect. A&O X3    Ortho Exam  Right knee: Surgical incisions are well-healed, mild swelling, no discoloration.  Tenderness to the medial joint line.  Tenderness to lateral joint line.  Full knee extension with 115 degrees flexion.  Good motion of the ankle.  Sensation is intact.  Neurovascular intact distally.  Fully weightbearing, gait nonantalgic.    Imaging Results (Most Recent)     None           Right knee : R knee  Date/Time: 11/9/2022 8:23 AM  Consent given by: patient  Site marked: site marked  Timeout: Immediately prior to procedure a time out was called to verify the correct patient, procedure, equipment, support staff and site/side marked as required   Supporting Documentation  Indications: pain   Procedure Details  Location: knee - R knee  Needle gauge: 21G.  Medications administered: 48 mg hylan 48 MG/6ML  Patient tolerance: patient tolerated the procedure well with no immediate complications            "   Assessment and Plan   Problem List Items Addressed This Visit        Musculoskeletal and Injuries    Primary osteoarthritis of right knee - Primary       Follow Up   Return if symptoms worsen or fail to improve.  Patient non smoker, no smoking cessation discussed necessary.  Call or return if worsening symptoms.  Patient Instructions   Right knee Synvisc injection given today. Advised 6 month duration between injections. Recommend gentle ROM in the knee for the rest of the day, ice and anti inflammatory for pain/swelling    Follow up PRN.    Patient was given instructions and counseling regarding her condition or for health maintenance advice. Please see specific information pulled into the AVS if appropriate.

## 2022-11-09 NOTE — PATIENT INSTRUCTIONS
Right knee Synvisc injection given today. Advised 6 month duration between injections. Recommend gentle ROM in the knee for the rest of the day, ice and anti inflammatory for pain/swelling    Follow up PRN.

## 2023-03-02 ENCOUNTER — TELEPHONE (OUTPATIENT)
Dept: ORTHOPEDIC SURGERY | Facility: CLINIC | Age: 58
End: 2023-03-02

## 2023-03-02 NOTE — TELEPHONE ENCOUNTER
Caller: Ann Marie Bunch    Relationship to patient: Self    Best call back number: [phone]    Type of visit: FOLLOW UP / RIGHT KNEE / NO NEW INJURY / XR 09-12-22 (EPIC) / PRIOR RIGHT KNEE GEL INJECTION 11-09-22 & RIGHT KNEE CORTISONE INJ 09-12-22     PATIENT AWARE GEL INJECTIONS MUST BE SCHEDULED EVERY 6 MONTHS & 2 DAYS DUE TO INSURANCE RESTRICTIONS & REQUIRE INSURANCE PRIOR AUTH     PATIENT REQUESTS OFFICE OBTAIN MALINA BAKER INS PRIOR AUTH & SCHEDULE FOR NEXT ELIGIBLE GEL INJECTION APPT 05-11-23 OR LATER     BUT ALSO SCHEDULE APPT FOR RIGHT KNEE CORTISONE INJECTION IN THE MEANTIME     Requested date: ANY DAY ANY TIME, MARGIE BECK (OR DR BERMAN) [place]     Additional notes: PLEASE CALL / LEAVE VMAIL TO DISCUSS SCHEDULING RIGHT KNEE INJECTIONs     THANKS

## 2023-03-15 ENCOUNTER — HOSPITAL ENCOUNTER (OUTPATIENT)
Dept: CARDIOLOGY | Facility: HOSPITAL | Age: 58
Discharge: HOME | End: 2023-03-15
Payer: COMMERCIAL

## 2023-03-15 ENCOUNTER — LAB (OUTPATIENT)
Dept: LAB | Facility: HOSPITAL | Age: 58
End: 2023-03-15
Payer: COMMERCIAL

## 2023-03-15 ENCOUNTER — TRANSCRIBE ORDERS (OUTPATIENT)
Dept: ADMINISTRATIVE | Facility: HOSPITAL | Age: 58
End: 2023-03-15
Payer: COMMERCIAL

## 2023-03-15 ENCOUNTER — HOSPITAL ENCOUNTER (OUTPATIENT)
Dept: GENERAL RADIOLOGY | Facility: HOSPITAL | Age: 58
Discharge: HOME | End: 2023-03-15
Payer: COMMERCIAL

## 2023-03-15 DIAGNOSIS — M19.071: ICD-10-CM

## 2023-03-15 DIAGNOSIS — M20.42 OTHER HAMMER TOE(S) (ACQUIRED), LEFT FOOT: ICD-10-CM

## 2023-03-15 DIAGNOSIS — M25.371 OTHER INSTABILITY, RIGHT ANKLE: ICD-10-CM

## 2023-03-15 DIAGNOSIS — M21.42 FLAT FOOT (PES PLANUS) (ACQUIRED), LEFT FOOT: ICD-10-CM

## 2023-03-15 DIAGNOSIS — M19.071: Primary | ICD-10-CM

## 2023-03-15 LAB
25(OH)D3 SERPL-MCNC: 30.6 NG/ML (ref 30–100)
BASOPHILS # BLD AUTO: 0.05 10*3/MM3 (ref 0–0.2)
BASOPHILS NFR BLD AUTO: 0.5 % (ref 0–1.5)
DEPRECATED RDW RBC AUTO: 42.9 FL (ref 37–54)
EOSINOPHIL # BLD AUTO: 0.12 10*3/MM3 (ref 0–0.4)
EOSINOPHIL NFR BLD AUTO: 1.2 % (ref 0.3–6.2)
ERYTHROCYTE [DISTWIDTH] IN BLOOD BY AUTOMATED COUNT: 13.1 % (ref 12.3–15.4)
HCT VFR BLD AUTO: 42.9 % (ref 34–46.6)
HGB BLD-MCNC: 14.3 G/DL (ref 12–15.9)
IMM GRANULOCYTES # BLD AUTO: 0.04 10*3/MM3 (ref 0–0.05)
IMM GRANULOCYTES NFR BLD AUTO: 0.4 % (ref 0–0.5)
LYMPHOCYTES # BLD AUTO: 3.44 10*3/MM3 (ref 0.7–3.1)
LYMPHOCYTES NFR BLD AUTO: 35 % (ref 19.6–45.3)
MCH RBC QN AUTO: 30.2 PG (ref 26.6–33)
MCHC RBC AUTO-ENTMCNC: 33.3 G/DL (ref 31.5–35.7)
MCV RBC AUTO: 90.5 FL (ref 79–97)
MONOCYTES # BLD AUTO: 0.73 10*3/MM3 (ref 0.1–0.9)
MONOCYTES NFR BLD AUTO: 7.4 % (ref 5–12)
NEUTROPHILS NFR BLD AUTO: 5.46 10*3/MM3 (ref 1.7–7)
NEUTROPHILS NFR BLD AUTO: 55.5 % (ref 42.7–76)
NRBC BLD AUTO-RTO: 0 /100 WBC (ref 0–0.2)
PLATELET # BLD AUTO: 255 10*3/MM3 (ref 140–450)
PMV BLD AUTO: 10.9 FL (ref 6–12)
QT INTERVAL: 421 MS
RBC # BLD AUTO: 4.74 10*6/MM3 (ref 3.77–5.28)
WBC NRBC COR # BLD: 9.84 10*3/MM3 (ref 3.4–10.8)

## 2023-03-15 PROCEDURE — 93005 ELECTROCARDIOGRAM TRACING: CPT | Performed by: PODIATRIST

## 2023-03-15 PROCEDURE — 36415 COLL VENOUS BLD VENIPUNCTURE: CPT

## 2023-03-15 PROCEDURE — 71046 X-RAY EXAM CHEST 2 VIEWS: CPT

## 2023-03-15 PROCEDURE — 82306 VITAMIN D 25 HYDROXY: CPT

## 2023-03-15 PROCEDURE — 85025 COMPLETE CBC W/AUTO DIFF WBC: CPT

## 2023-03-17 ENCOUNTER — OFFICE VISIT (OUTPATIENT)
Dept: ORTHOPEDIC SURGERY | Facility: CLINIC | Age: 58
End: 2023-03-17
Payer: COMMERCIAL

## 2023-03-17 ENCOUNTER — TRANSCRIBE ORDERS (OUTPATIENT)
Dept: LAB | Facility: HOSPITAL | Age: 58
End: 2023-03-17
Payer: COMMERCIAL

## 2023-03-17 ENCOUNTER — LAB (OUTPATIENT)
Dept: LAB | Facility: HOSPITAL | Age: 58
End: 2023-03-17
Payer: COMMERCIAL

## 2023-03-17 VITALS — BODY MASS INDEX: 46.95 KG/M2 | HEIGHT: 63 IN | WEIGHT: 265 LBS

## 2023-03-17 DIAGNOSIS — M17.11 PRIMARY OSTEOARTHRITIS OF RIGHT KNEE: Primary | ICD-10-CM

## 2023-03-17 DIAGNOSIS — R26.2 DIFFICULTY IN WALKING, NOT ELSEWHERE CLASSIFIED: ICD-10-CM

## 2023-03-17 DIAGNOSIS — M79.672 PAIN IN LEFT FOOT: ICD-10-CM

## 2023-03-17 DIAGNOSIS — M79.672 PAIN IN LEFT FOOT: Primary | ICD-10-CM

## 2023-03-17 LAB
ALBUMIN SERPL-MCNC: 4.1 G/DL (ref 3.5–5.2)
ALBUMIN/GLOB SERPL: 1.4 G/DL
ALP SERPL-CCNC: 96 U/L (ref 39–117)
ALT SERPL W P-5'-P-CCNC: 14 U/L (ref 1–33)
ANION GAP SERPL CALCULATED.3IONS-SCNC: 8 MMOL/L (ref 5–15)
AST SERPL-CCNC: 20 U/L (ref 1–32)
BILIRUB SERPL-MCNC: 0.3 MG/DL (ref 0–1.2)
BUN SERPL-MCNC: 16 MG/DL (ref 8–23)
BUN/CREAT SERPL: 23.2 (ref 7–25)
CALCIUM SPEC-SCNC: 9.3 MG/DL (ref 8.6–10.5)
CHLORIDE SERPL-SCNC: 104 MMOL/L (ref 98–107)
CO2 SERPL-SCNC: 29 MMOL/L (ref 22–29)
CREAT SERPL-MCNC: 0.69 MG/DL (ref 0.57–1)
EGFRCR SERPLBLD CKD-EPI 2021: 99.5 ML/MIN/1.73
GLOBULIN UR ELPH-MCNC: 3 GM/DL
GLUCOSE SERPL-MCNC: 96 MG/DL (ref 65–99)
POTASSIUM SERPL-SCNC: 4.7 MMOL/L (ref 3.5–5.2)
PROT SERPL-MCNC: 7.1 G/DL (ref 6–8.5)
SODIUM SERPL-SCNC: 141 MMOL/L (ref 136–145)

## 2023-03-17 PROCEDURE — 20610 DRAIN/INJ JOINT/BURSA W/O US: CPT | Performed by: ORTHOPAEDIC SURGERY

## 2023-03-17 PROCEDURE — 80053 COMPREHEN METABOLIC PANEL: CPT

## 2023-03-17 PROCEDURE — 99213 OFFICE O/P EST LOW 20 MIN: CPT | Performed by: ORTHOPAEDIC SURGERY

## 2023-03-17 PROCEDURE — 36415 COLL VENOUS BLD VENIPUNCTURE: CPT

## 2023-03-17 RX ORDER — LIDOCAINE HYDROCHLORIDE 10 MG/ML
5 INJECTION, SOLUTION EPIDURAL; INFILTRATION; INTRACAUDAL; PERINEURAL
Status: COMPLETED | OUTPATIENT
Start: 2023-03-17 | End: 2023-03-17

## 2023-03-17 RX ORDER — METHYLPREDNISOLONE ACETATE 80 MG/ML
80 INJECTION, SUSPENSION INTRA-ARTICULAR; INTRALESIONAL; INTRAMUSCULAR; SOFT TISSUE
Status: COMPLETED | OUTPATIENT
Start: 2023-03-17 | End: 2023-03-17

## 2023-03-17 RX ADMIN — LIDOCAINE HYDROCHLORIDE 5 ML: 10 INJECTION, SOLUTION EPIDURAL; INFILTRATION; INTRACAUDAL; PERINEURAL at 11:35

## 2023-03-17 RX ADMIN — METHYLPREDNISOLONE ACETATE 80 MG: 80 INJECTION, SUSPENSION INTRA-ARTICULAR; INTRALESIONAL; INTRAMUSCULAR; SOFT TISSUE at 11:35

## 2023-03-17 NOTE — PROGRESS NOTES
"Chief Complaint  Follow-up of the Right Knee     Subjective      Ann Marie Bunch presents to [facility] for follow up of the right knee. Patient has a history of right knee arthroscopy. She had a medial meniscectomy chondroplasty removal of loose bodies on 3/11/21.  She has had injections in the past that has helped give relief. She is here today for repeat steroid injection.  She has had no recent injury.     Allergies   Allergen Reactions   • Aspirin Nausea Only     STATES \"TEARS HER STOMACH UP\" EVEN WITH FOOD   • Motrin [Ibuprofen] Swelling   • Nsaids Swelling and GI Intolerance        Social History     Socioeconomic History   • Marital status:    Tobacco Use   • Smoking status: Former   • Smokeless tobacco: Never   • Tobacco comments:     10/20/2017   Vaping Use   • Vaping Use: Never used   Substance and Sexual Activity   • Alcohol use: Yes     Comment: occasionally drinks    • Drug use: Never   • Sexual activity: Defer        Review of Systems     Objective   Vital Signs:   Ht 160 cm (63\")   Wt 120 kg (265 lb)   BMI 46.94 kg/m²       Physical Exam  Constitutional:       Appearance: Normal appearance. Patient is well-developed and normal weight.   HENT:      Head: Normocephalic.      Right Ear: Hearing and external ear normal.      Left Ear: Hearing and external ear normal.      Nose: Nose normal.   Eyes:      Conjunctiva/sclera: Conjunctivae normal.   Cardiovascular:      Rate and Rhythm: Normal rate.   Pulmonary:      Effort: Pulmonary effort is normal.      Breath sounds: No wheezing or rales.   Abdominal:      Palpations: Abdomen is soft.      Tenderness: There is no abdominal tenderness.   Musculoskeletal:      Cervical back: Normal range of motion.   Skin:     Findings: No rash.   Neurological:      Mental Status: Patient  is alert and oriented to person, place, and time.   Psychiatric:         Mood and Affect: Mood and affect normal.         Judgment: Judgment " normal.       Ortho Exam      Right knee: Surgical incisions are well-healed, mild swelling, no discoloration.  Tenderness to the medial joint line.  Tenderness to lateral joint line.  Full knee extension with 115 degrees flexion.  Good motion of the ankle.  Sensation is intact.  Neurovascular intact distally.  Fully weightbearing, gait nonantalgic.      Large Joint Arthrocentesis: R knee  Date/Time: 3/17/2023 11:35 AM  Consent given by: patient  Site marked: site marked  Timeout: Immediately prior to procedure a time out was called to verify the correct patient, procedure, equipment, support staff and site/side marked as required   Supporting Documentation  Indications: pain   Procedure Details  Location: knee - R knee  Preparation: Patient was prepped and draped in the usual sterile fashion  Needle size: 22 G  Medications administered: 5 mL lidocaine PF 1% 1 %; 80 mg methylPREDNISolone acetate 80 MG/ML  Patient tolerance: patient tolerated the procedure well with no immediate complications              Imaging Results (Most Recent)     None           Result Review :                Assessment and Plan     Diagnoses and all orders for this visit:    1. Primary osteoarthritis of right knee (Primary)        Discussed the treatment plan with the patient. Discussed the risks and benefits of conservative measures.  The patient expressed understanding and wished to proceed with a right knee steroid injection.  She tolerated the injection well.     Call or return if worsening symptoms.    Follow Up     PRN      Patient was given instructions and counseling regarding her condition or for health maintenance advice. Please see specific information pulled into the AVS if appropriate.     Scribed for Kun Clark MD by Quin Willard MA.  03/17/23   10:49 EDT    I have personally performed the services described in this document as scribed by the above individual and it is both accurate and complete. Kun Clark MD  03/17/23

## 2023-05-15 ENCOUNTER — OFFICE VISIT (OUTPATIENT)
Dept: ORTHOPEDIC SURGERY | Facility: CLINIC | Age: 58
End: 2023-05-15
Payer: COMMERCIAL

## 2023-05-15 VITALS — WEIGHT: 265 LBS | BODY MASS INDEX: 46.95 KG/M2 | HEIGHT: 63 IN

## 2023-05-15 DIAGNOSIS — M17.11 PRIMARY OSTEOARTHRITIS OF RIGHT KNEE: Primary | ICD-10-CM

## 2023-05-15 NOTE — PROGRESS NOTES
"Chief Complaint  Pain and Follow-up of the Right Knee    Subjective      Ann Marie Bunch presents to [facility] for follow-up of right knee pain and osteoarthritis.  She has a previous history of right knee arthroscopy with partial medial meniscectomy, chondroplasty, and removal of loose bodies performed on 3/11/2021.  She has had continued right knee pain, which she has managed conservatively with intermittent injections in the past.  She previously received right knee Synvisc injection in 11/2022 followed by steroid injection in office on 3/17/2023.  She presents today independently ambulatory without use of assistive device.  She does have a walking boot in place to her left foot, reporting recent bunionectomy and hammertoe x2.    Objective   Allergies   Allergen Reactions   • Aspirin Nausea Only     STATES \"TEARS HER STOMACH UP\" EVEN WITH FOOD   • Motrin [Ibuprofen] Swelling   • Nsaids Swelling and GI Intolerance       Vital Signs:   Ht 160 cm (63\")   Wt 120 kg (265 lb)   BMI 46.94 kg/m²       Physical Exam    Constitutional: Awake, alert. Well nourished appearance.    Integumentary: Warm, dry, intact. No obvious rashes.    HENT: Atraumatic, normocephalic.   Respiratory: Non labored respirations .   Cardiovascular: Intact peripheral pulses.    Psychiatric: Normal mood and affect. A&O X3    Ortho Exam  Right knee: Skin is warm, dry, and intact.  Tenderness to palpation of joint lines.  No crepitus with ROM.  Full knee extension and flexion to 120 degrees.  Full plantarflexion and dorsiflexion of the ankle.  Sensation intact light touch.  Distal neurovascular intact.    Imaging Results (Most Recent)     None           Large Joint Arthrocentesis: R knee  Date/Time: 5/15/2023 9:33 AM  Consent given by: patient  Site marked: site marked  Timeout: Immediately prior to procedure a time out was called to verify the correct patient, procedure, equipment, support staff and site/side marked " as required   Supporting Documentation  Indications: pain   Procedure Details  Location: knee - R knee  Needle gauge: 21g.  Medications administered: 48 mg hylan 48 MG/6ML  Patient tolerance: patient tolerated the procedure well with no immediate complications              Assessment and Plan   Problem List Items Addressed This Visit        Musculoskeletal and Injuries    Primary osteoarthritis of right knee - Primary       Follow Up   Return in about 6 weeks (around 6/26/2023).    Tobacco Use: Medium Risk   • Smoking Tobacco Use: Former   • Smokeless Tobacco Use: Never   • Passive Exposure: Not on file       Patient is a former smoker.  Encouraged continued tobacco cessation.  Did not discuss options for smoking cessation.    Patient Instructions   Right knee Synvisc injection administered in office today.  Patient advised 6-month duration between injections.  Can consider steroid injection after 6 weeks, if needed.      Follow-up in 6 weeks.  Call with changes or concerns.      Patient was given instructions and counseling regarding her condition or for health maintenance advice. Please see specific information pulled into the AVS if appropriate.

## 2023-05-15 NOTE — PATIENT INSTRUCTIONS
Right knee Synvisc injection administered in office today.  Patient advised 6-month duration between injections.  Can consider steroid injection after 6 weeks, if needed.      Follow-up in 6 weeks.  Call with changes or concerns.

## 2023-05-20 LAB
ALBUMIN SERPL-MCNC: 4 G/DL (ref 3.5–5.2)
ALBUMIN/GLOB SERPL: 1.3 G/DL
ALP SERPL-CCNC: 101 U/L (ref 39–117)
ALT SERPL W P-5'-P-CCNC: 11 U/L (ref 1–33)
ANION GAP SERPL CALCULATED.3IONS-SCNC: 10.9 MMOL/L (ref 5–15)
AST SERPL-CCNC: 12 U/L (ref 1–32)
BASOPHILS # BLD AUTO: 0.04 10*3/MM3 (ref 0–0.2)
BASOPHILS NFR BLD AUTO: 0.4 % (ref 0–1.5)
BILIRUB SERPL-MCNC: 0.3 MG/DL (ref 0–1.2)
BUN SERPL-MCNC: 16 MG/DL (ref 8–23)
BUN/CREAT SERPL: 22.9 (ref 7–25)
CALCIUM SPEC-SCNC: 9.4 MG/DL (ref 8.6–10.5)
CHLORIDE SERPL-SCNC: 103 MMOL/L (ref 98–107)
CO2 SERPL-SCNC: 28.1 MMOL/L (ref 22–29)
CREAT SERPL-MCNC: 0.7 MG/DL (ref 0.57–1)
D-LACTATE SERPL-SCNC: 1 MMOL/L (ref 0.5–2)
DEPRECATED RDW RBC AUTO: 45.8 FL (ref 37–54)
EGFRCR SERPLBLD CKD-EPI 2021: 99.2 ML/MIN/1.73
EOSINOPHIL # BLD AUTO: 0.19 10*3/MM3 (ref 0–0.4)
EOSINOPHIL NFR BLD AUTO: 1.9 % (ref 0.3–6.2)
ERYTHROCYTE [DISTWIDTH] IN BLOOD BY AUTOMATED COUNT: 13.4 % (ref 12.3–15.4)
GLOBULIN UR ELPH-MCNC: 3 GM/DL
GLUCOSE SERPL-MCNC: 110 MG/DL (ref 65–99)
HCT VFR BLD AUTO: 40.8 % (ref 34–46.6)
HGB BLD-MCNC: 13.3 G/DL (ref 12–15.9)
HOLD SPECIMEN: NORMAL
HOLD SPECIMEN: NORMAL
IMM GRANULOCYTES # BLD AUTO: 0.03 10*3/MM3 (ref 0–0.05)
IMM GRANULOCYTES NFR BLD AUTO: 0.3 % (ref 0–0.5)
LIPASE SERPL-CCNC: 141 U/L (ref 13–60)
LYMPHOCYTES # BLD AUTO: 4.01 10*3/MM3 (ref 0.7–3.1)
LYMPHOCYTES NFR BLD AUTO: 40 % (ref 19.6–45.3)
MCH RBC QN AUTO: 30.1 PG (ref 26.6–33)
MCHC RBC AUTO-ENTMCNC: 32.6 G/DL (ref 31.5–35.7)
MCV RBC AUTO: 92.3 FL (ref 79–97)
MONOCYTES # BLD AUTO: 0.66 10*3/MM3 (ref 0.1–0.9)
MONOCYTES NFR BLD AUTO: 6.6 % (ref 5–12)
NEUTROPHILS NFR BLD AUTO: 5.09 10*3/MM3 (ref 1.7–7)
NEUTROPHILS NFR BLD AUTO: 50.8 % (ref 42.7–76)
NRBC BLD AUTO-RTO: 0 /100 WBC (ref 0–0.2)
PLATELET # BLD AUTO: 235 10*3/MM3 (ref 140–450)
PMV BLD AUTO: 10.4 FL (ref 6–12)
POTASSIUM SERPL-SCNC: 4 MMOL/L (ref 3.5–5.2)
PROT SERPL-MCNC: 7 G/DL (ref 6–8.5)
RBC # BLD AUTO: 4.42 10*6/MM3 (ref 3.77–5.28)
SODIUM SERPL-SCNC: 142 MMOL/L (ref 136–145)
WBC NRBC COR # BLD: 10.02 10*3/MM3 (ref 3.4–10.8)
WHOLE BLOOD HOLD COAG: NORMAL
WHOLE BLOOD HOLD SPECIMEN: NORMAL

## 2023-05-20 PROCEDURE — 36415 COLL VENOUS BLD VENIPUNCTURE: CPT

## 2023-05-20 PROCEDURE — 83690 ASSAY OF LIPASE: CPT

## 2023-05-20 PROCEDURE — 83605 ASSAY OF LACTIC ACID: CPT

## 2023-05-20 PROCEDURE — 85025 COMPLETE CBC W/AUTO DIFF WBC: CPT

## 2023-05-20 PROCEDURE — 80053 COMPREHEN METABOLIC PANEL: CPT

## 2023-05-20 PROCEDURE — 99283 EMERGENCY DEPT VISIT LOW MDM: CPT

## 2023-05-20 RX ORDER — SODIUM CHLORIDE 0.9 % (FLUSH) 0.9 %
10 SYRINGE (ML) INJECTION AS NEEDED
Status: DISCONTINUED | OUTPATIENT
Start: 2023-05-20 | End: 2023-05-21 | Stop reason: HOSPADM

## 2023-05-21 ENCOUNTER — APPOINTMENT (OUTPATIENT)
Dept: ULTRASOUND IMAGING | Facility: HOSPITAL | Age: 58
End: 2023-05-21
Payer: COMMERCIAL

## 2023-05-21 ENCOUNTER — APPOINTMENT (OUTPATIENT)
Dept: CT IMAGING | Facility: HOSPITAL | Age: 58
End: 2023-05-21
Payer: COMMERCIAL

## 2023-05-21 ENCOUNTER — HOSPITAL ENCOUNTER (EMERGENCY)
Facility: HOSPITAL | Age: 58
Discharge: HOME | End: 2023-05-21
Attending: EMERGENCY MEDICINE | Admitting: EMERGENCY MEDICINE
Payer: COMMERCIAL

## 2023-05-21 VITALS
RESPIRATION RATE: 16 BRPM | HEART RATE: 69 BPM | DIASTOLIC BLOOD PRESSURE: 64 MMHG | OXYGEN SATURATION: 98 % | TEMPERATURE: 97.5 F | SYSTOLIC BLOOD PRESSURE: 127 MMHG | WEIGHT: 273.15 LBS | HEIGHT: 63 IN | BODY MASS INDEX: 48.4 KG/M2

## 2023-05-21 DIAGNOSIS — R10.10 PAIN OF UPPER ABDOMEN: Primary | ICD-10-CM

## 2023-05-21 DIAGNOSIS — N28.1 RENAL CYST, LEFT: ICD-10-CM

## 2023-05-21 LAB
BILIRUB UR QL STRIP: NEGATIVE
CLARITY UR: CLEAR
COLOR UR: YELLOW
GLUCOSE UR STRIP-MCNC: NEGATIVE MG/DL
HGB UR QL STRIP.AUTO: NEGATIVE
KETONES UR QL STRIP: NEGATIVE
LEUKOCYTE ESTERASE UR QL STRIP.AUTO: NEGATIVE
NITRITE UR QL STRIP: NEGATIVE
PH UR STRIP.AUTO: 5.5 [PH] (ref 5–8)
PROT UR QL STRIP: NEGATIVE
SP GR UR STRIP: 1.02 (ref 1–1.03)
UROBILINOGEN UR QL STRIP: NORMAL

## 2023-05-21 PROCEDURE — 25510000001 IOPAMIDOL PER 1 ML: Performed by: EMERGENCY MEDICINE

## 2023-05-21 PROCEDURE — 76705 ECHO EXAM OF ABDOMEN: CPT

## 2023-05-21 PROCEDURE — 74177 CT ABD & PELVIS W/CONTRAST: CPT

## 2023-05-21 PROCEDURE — 96375 TX/PRO/DX INJ NEW DRUG ADDON: CPT

## 2023-05-21 PROCEDURE — 81003 URINALYSIS AUTO W/O SCOPE: CPT | Performed by: EMERGENCY MEDICINE

## 2023-05-21 PROCEDURE — 96374 THER/PROPH/DIAG INJ IV PUSH: CPT

## 2023-05-21 PROCEDURE — 25010000002 ONDANSETRON PER 1 MG: Performed by: EMERGENCY MEDICINE

## 2023-05-21 PROCEDURE — 25010000002 MORPHINE PER 10 MG: Performed by: EMERGENCY MEDICINE

## 2023-05-21 RX ORDER — PANTOPRAZOLE SODIUM 40 MG/1
40 TABLET, DELAYED RELEASE ORAL DAILY
Qty: 30 TABLET | Refills: 0 | Status: SHIPPED | OUTPATIENT
Start: 2023-05-21 | End: 2023-06-20

## 2023-05-21 RX ORDER — SODIUM CHLORIDE 0.9 % (FLUSH) 0.9 %
10 SYRINGE (ML) INJECTION AS NEEDED
Status: DISCONTINUED | OUTPATIENT
Start: 2023-05-21 | End: 2023-05-21 | Stop reason: HOSPADM

## 2023-05-21 RX ORDER — ONDANSETRON 4 MG/1
8 TABLET, ORALLY DISINTEGRATING ORAL EVERY 8 HOURS PRN
Qty: 15 TABLET | Refills: 0 | Status: SHIPPED | OUTPATIENT
Start: 2023-05-21 | End: 2023-05-26

## 2023-05-21 RX ORDER — ONDANSETRON 2 MG/ML
4 INJECTION INTRAMUSCULAR; INTRAVENOUS ONCE
Status: COMPLETED | OUTPATIENT
Start: 2023-05-21 | End: 2023-05-21

## 2023-05-21 RX ADMIN — IOPAMIDOL 100 ML: 755 INJECTION, SOLUTION INTRAVENOUS at 02:05

## 2023-05-21 RX ADMIN — MORPHINE SULFATE 4 MG: 4 INJECTION, SOLUTION INTRAMUSCULAR; INTRAVENOUS at 01:00

## 2023-05-21 RX ADMIN — SODIUM CHLORIDE 1000 ML: 9 INJECTION, SOLUTION INTRAVENOUS at 01:00

## 2023-05-21 RX ADMIN — ONDANSETRON 4 MG: 2 INJECTION INTRAMUSCULAR; INTRAVENOUS at 01:00

## 2023-05-21 NOTE — ED PROVIDER NOTES
"Time: 8:24 PM EDT  Date of encounter:  5/20/2023  Independent Historian/Clinical History and Information was obtained by:   Patient     Chief complaint: Abdominal    History is limited by: N/A    History of Present Illness:  Patient is a 58 y.o. year old female who presents to the emergency department for evaluation of sudden right lower quadrant pain that she describes as stabbing.  She states the pain is centralized in the right upper quadrant but radiates across the upper abdomen.  There is no radiation to the back.  The patient has no nausea or vomiting.  Patient's had no fever rigors or myalgias.  The patient denies any chest pain or shortness of breath.  The patient had 2 bowel movements today.  She states abutments are normal no hematochezia or melena.  The patient states that she has chronic urinary frequency.  The patient denies any urinary urgency, dysuria or gross hematuria.  The patient notes that she still has her gallbladder.  The patient states that she does not abuse alcohol.  She has not had similar symptoms prior.  HPI    Patient Care Team  Primary Care Provider: Heike Buchanan APRN    Past Medical History:     Allergies   Allergen Reactions   • Aspirin Nausea Only     STATES \"TEARS HER STOMACH UP\" EVEN WITH FOOD   • Motrin [Ibuprofen] Swelling   • Nsaids Swelling and GI Intolerance     Past Medical History:   Diagnosis Date   • Asthma    • Chronic obstructive pulmonary disease    • Diabetes    • H/O shoulder surgery 08/31/2017    aftercare following right shoulder arthroscopy    • Limb swelling    • Low back pain      Past Surgical History:   Procedure Laterality Date   • BACK SURGERY     • KNEE SURGERY     • OTHER SURGICAL HISTORY      joint surgery    • SHOULDER ARTHROSCOPY     • TONSILLECTOMY       Family History   Problem Relation Age of Onset   • Arthritis Mother         family history of certain chronic disabling diseases    • Cancer Father         unspecified   • Diabetes Father     "     unspecified type   • Cancer Sister         unspecified   • Diabetes Sister         unspecified type       Home Medications:  Prior to Admission medications    Medication Sig Start Date End Date Taking? Authorizing Provider   acyclovir (ZOVIRAX) 400 MG tablet Take 1 tablet by mouth 5 (Five) Times a Day. Take no more than 5 doses a day. 7/16/21   Jeanmarie Mariee APRN   albuterol sulfate  (90 Base) MCG/ACT inhaler Inhale 2 puffs.    Kavon Villasenor MD   baclofen (LIORESAL) 10 MG tablet TAKE 1 TABLET BY MOUTH THREE TIMES DAILY 5/10/21   Kavon Villasenor MD   benzonatate (TESSALON) 200 MG capsule Take 1 capsule by mouth. 12/11/20   Kavon Villasenor MD   cetirizine (zyrTEC) 10 MG tablet TAKE 1 TABLET BY MOUTH 2 HOURS BEFORE CLUSTER 8/10/22   Kavon Villasenor MD   famotidine (PEPCID) 20 MG tablet TAKE 1 TABLET BY MOUTH 2 HOURS BEFORE CLUSTER THERAPY 8/10/22   Kavon Villasenor MD   fluticasone-salmeterol (ADVAIR) 100-50 MCG/DOSE DISKUS Advair Diskus 100-50 mcg/dose inhalation blister with device inhale 1 puff by inhalation route 2 times per day in the morning and evening approximately 12 hours apart   Active    Kavon Villasenor MD   hydrOXYzine (ATARAX) 25 MG tablet Take 1 tablet by mouth 3 (Three) Times a Day As Needed for Itching. 7/18/21   Deon Medina APRN   loratadine (CLARITIN) 10 MG tablet loratadine 10 mg oral tablet take 1 tablet (10 mg) by oral route once daily   Active    Kavon Villasenor MD   methscopolamine (PAMINE) 2.5 MG tablet methscopolamine 2.5 mg oral tablet take 1 tablet (2.5 mg) by oral route 3 times per day before meals then 2 tablets (5 mg) by oral route at bedtime   Active 12/11/20   Kavon Villasenor MD   montelukast (SINGULAIR) 10 MG tablet  9/8/22   Kavon Villasenor MD   predniSONE (DELTASONE) 20 MG tablet Take 2 tablets by mouth Daily. 7/18/21   Deon Medina APRN   pseudoephedrine (SUDAFED) 120 MG 12 hr tablet Take 1 tablet by mouth  "Every 12 (Twelve) Hours. 6/24/21   Provider, MD Kavon        Social History:   Social History     Tobacco Use   • Smoking status: Former   • Smokeless tobacco: Never   • Tobacco comments:     10/20/2017   Vaping Use   • Vaping Use: Never used   Substance Use Topics   • Alcohol use: Yes     Comment: occasionally drinks    • Drug use: Never         Review of Systems:  Review of Systems   Constitutional: Negative for chills, diaphoresis and fever.   HENT: Negative for congestion, postnasal drip, rhinorrhea and sore throat.    Eyes: Negative for photophobia.   Respiratory: Negative for cough, chest tightness and shortness of breath.    Cardiovascular: Negative for chest pain, palpitations and leg swelling.   Gastrointestinal: Positive for abdominal pain. Negative for diarrhea, nausea and vomiting.   Genitourinary: Negative for difficulty urinating, dysuria, flank pain, frequency, hematuria and urgency.   Musculoskeletal: Negative for neck pain and neck stiffness.   Skin: Negative for pallor and rash.   Neurological: Negative for dizziness, syncope, weakness, numbness and headaches.   Hematological: Negative for adenopathy. Does not bruise/bleed easily.   Psychiatric/Behavioral: Negative.         Physical Exam:  /64   Pulse 68   Temp 97.5 °F (36.4 °C) (Oral)   Resp 16   Ht 160 cm (63\")   Wt 124 kg (273 lb 2.4 oz)   SpO2 98%   BMI 48.39 kg/m²     Physical Exam  Vitals and nursing note reviewed.   Constitutional:       General: She is not in acute distress.     Appearance: Normal appearance. She is obese. She is not ill-appearing, toxic-appearing or diaphoretic.   HENT:      Head: Normocephalic and atraumatic.      Mouth/Throat:      Mouth: Mucous membranes are moist.   Eyes:      Pupils: Pupils are equal, round, and reactive to light.   Cardiovascular:      Rate and Rhythm: Normal rate and regular rhythm.      Pulses: Normal pulses.           Carotid pulses are 2+ on the right side and 2+ on the left " side.       Radial pulses are 2+ on the right side and 2+ on the left side.        Femoral pulses are 2+ on the right side and 2+ on the left side.       Popliteal pulses are 2+ on the right side and 2+ on the left side.        Dorsalis pedis pulses are 2+ on the right side and 2+ on the left side.        Posterior tibial pulses are 2+ on the right side and 2+ on the left side.      Heart sounds: Normal heart sounds. No murmur heard.  Pulmonary:      Effort: Pulmonary effort is normal. No accessory muscle usage, respiratory distress or retractions.      Breath sounds: Normal breath sounds. No wheezing, rhonchi or rales.   Abdominal:      General: Abdomen is flat. There is no distension.      Palpations: Abdomen is soft. There is no hepatomegaly, splenomegaly, mass or pulsatile mass.      Tenderness: There is abdominal tenderness in the right upper quadrant. There is no right CVA tenderness, left CVA tenderness, guarding or rebound. Negative signs include Meyers's sign and McBurney's sign.      Comments: No rigidity   Musculoskeletal:         General: No swelling, tenderness or deformity.      Cervical back: Neck supple. No tenderness.      Right lower leg: No edema.      Left lower leg: No edema.   Skin:     General: Skin is warm and dry.      Capillary Refill: Capillary refill takes less than 2 seconds.      Coloration: Skin is not jaundiced or pale.      Findings: No erythema.   Neurological:      General: No focal deficit present.      Mental Status: She is alert and oriented to person, place, and time. Mental status is at baseline.      Cranial Nerves: Cranial nerves 2-12 are intact. No cranial nerve deficit.      Sensory: Sensation is intact. No sensory deficit.      Motor: Motor function is intact. No weakness or pronator drift.      Coordination: Coordination is intact. Coordination normal.   Psychiatric:         Mood and Affect: Mood normal.         Behavior: Behavior normal.                   Procedures:  Procedures      Medical Decision Making:      Comorbidities that affect care:    Asthma, COPD, Diabetes    External Notes reviewed:    None      The following orders were placed and all results were independently analyzed by me:  Orders Placed This Encounter   Procedures   • US Gallbladder   • CT Abdomen Pelvis With Contrast   • [place] Draw   • Comprehensive Metabolic Panel   • Lipase   • Urinalysis With Microscopic If Indicated (No Culture) - Urine, Clean Catch   • Lactic Acid, Plasma   • CBC Auto Differential   • NPO Diet NPO Type: Strict NPO   • Undress & Gown   • Insert Peripheral IV   • Insert peripheral IV   • CBC & Differential   • Green Top (Gel)   • Lavender Top   • Gold Top - SST   • Light Blue Top       Medications Given in the Emergency Department:  Medications   sodium chloride 0.9 % flush 10 mL (has no administration in time range)   sodium chloride 0.9 % flush 10 mL (has no administration in time range)   sodium chloride 0.9 % bolus 1,000 mL (1,000 mL Intravenous New Bag 5/21/23 0100)   morphine injection 4 mg (4 mg Intravenous Given 5/21/23 0100)   ondansetron (ZOFRAN) injection 4 mg (4 mg Intravenous Given 5/21/23 0100)   iopamidol (ISOVUE-370) 76 % injection 100 mL (100 mL Intravenous Given 5/21/23 0205)        ED Course:    The patient was initially evaluated in the triage area where orders were placed. The patient was later dispositioned by Marcus Burnette DO.      The patient was advised to stay for completion of workup which includes but is not limited to communication of labs and radiological results, reassessment and plan. The patient was advised that leaving prior to disposition by a provider could result in critical findings that are not communicated to the patient.     ED Course as of 05/21/23 0336   Sat May 20, 2023   2026 PROVIDER IN TRIAGE  Patient was evaluated by me in triage, Michael Tinajero PA-C.  Orders were placed and patient is currently awaiting final results  and disposition.  [MD]      ED Course User Index  [MD] Micheal Tinajero PA-C       Labs:    Lab Results (last 24 hours)     Procedure Component Value Units Date/Time    CBC & Differential [[id number]]  (Abnormal) Collected: 05/20/23 2036    Specimen: Blood Updated: 05/20/23 2049    Narrative:      The following orders were created for panel order CBC & Differential.  Procedure                               Abnormality         Status                     ---------                               -----------         ------                     CBC Auto Differential[[id number]]        Abnormal            Final result                 Please view results for these tests on the individual orders.    Comprehensive Metabolic Panel [[id number]]  (Abnormal) Collected: 05/20/23 2036    Specimen: Blood Updated: 05/20/23 2116     Glucose 110 mg/dL      BUN 16 mg/dL      Creatinine 0.70 mg/dL      Sodium 142 mmol/L      Potassium 4.0 mmol/L      Chloride 103 mmol/L      CO2 28.1 mmol/L      Calcium 9.4 mg/dL      Total Protein 7.0 g/dL      Albumin 4.0 g/dL      ALT (SGPT) 11 U/L      AST (SGOT) 12 U/L      Alkaline Phosphatase 101 U/L      Total Bilirubin 0.3 mg/dL      Globulin 3.0 gm/dL      A/G Ratio 1.3 g/dL      BUN/Creatinine Ratio 22.9     Anion Gap 10.9 mmol/L      eGFR 99.2 mL/min/1.73     Narrative:      GFR Normal >60  Chronic Kidney Disease <60  Kidney Failure <15      Lipase [[id number]]  (Abnormal) Collected: 05/20/23 2036    Specimen: Blood Updated: 05/20/23 2116     Lipase 141 U/L     Lactic Acid, Plasma [[id number]]  (Normal) Collected: 05/20/23 2036    Specimen: Blood Updated: 05/20/23 2111     Lactate 1.0 mmol/L     CBC Auto Differential [[id number]]  (Abnormal) Collected: 05/20/23 2036    Specimen: Blood Updated: 05/20/23 2049     WBC 10.02 10*3/mm3      RBC 4.42 10*6/mm3      Hemoglobin 13.3 g/dL      Hematocrit 40.8 %      MCV 92.3 fL      MCH 30.1 pg      MCHC 32.6 g/dL      RDW 13.4 %      RDW-SD 45.8 fl      MPV  "10.4 fL      Platelets 235 10*3/mm3      Neutrophil % 50.8 %      Lymphocyte % 40.0 %      Monocyte % 6.6 %      Eosinophil % 1.9 %      Basophil % 0.4 %      Immature Grans % 0.3 %      Neutrophils, Absolute 5.09 10*3/mm3      Lymphocytes, Absolute 4.01 10*3/mm3      Monocytes, Absolute 0.66 10*3/mm3      Eosinophils, Absolute 0.19 10*3/mm3      Basophils, Absolute 0.04 10*3/mm3      Immature Grans, Absolute 0.03 10*3/mm3      nRBC 0.0 /100 WBC     Urinalysis With Microscopic If Indicated (No Culture) - Urine, Clean Catch [[id number]]  (Normal) Collected: 05/21/23 0042    Specimen: Urine, Clean Catch Updated: 05/21/23 0056     Color, UA Yellow     Appearance, UA Clear     pH, UA 5.5     Specific Gravity, UA 1.024     Glucose, UA Negative     Ketones, UA Negative     Bilirubin, UA Negative     Blood, UA Negative     Protein, UA Negative     Leuk Esterase, UA Negative     Nitrite, UA Negative     Urobilinogen, UA 0.2 E.U./dL    Narrative:      Urine microscopic not indicated.           Imaging:    CT Abdomen Pelvis With Contrast    Result Date: 5/21/2023  PROCEDURE: CT ABDOMEN PELVIS W CONTRAST  COMPARISONS: Abdominal ultrasound, 5/21/2023, 0054 hours; chest CT exams, 8/24/2014, 3/2/2006.  INDICATIONS: right lower quadrant abd. pain that she describes as \"stabbing;\" no h/o recent trauma is provided  TECHNIQUE: After obtaining the patient's consent, 828 CT images were created with non-ionic intravenous contrast material.  No oral contrast agent was administered for the study.  PROTOCOL:   Standard CT imaging protocol performed.    RADIATION:   Total DLP: 1,099.7 mGy*cm   Automated exposure control was utilized to minimize radiation dose. CONTRAST: 100 mL Isovue 370 I.V.  FINDINGS: There is diffuse hepatic steatosis with hepatomegaly.  No splenomegaly is seen.  There is chronic calcified granulomatous disease of the chest and the abdomen.  No gallstones.  No acute cholecystitis.  No acute pancreatitis.  There are renal " cysts, which are estimated at about 2 cm or less in size.  No delayed CT imaging through the kidneys was performed.  There is a possible complex cyst involving the upper pole of the left kidney, again, measuring about 2 cm.  This finding is thought less likely to represent a renal infarct or to be a manifestation of age-indeterminate pyelonephritis or an intrarenal phlegmon or early abscess.  It has CT number readings that range between 55 and 61 Hounsfield units.  It is not clearly a benign cyst.  It is not seen on prior relevant correlative [facility] imaging studies.  No hydronephrosis or ureterolithiasis.  No nephrolithiasis.  No urinary bladder calculi.  There are pelvic phleboliths.  No suspicious uterine or adnexal mass.  There are scattered colonic diverticula without acute diverticulitis.  No acute colitis or appendicitis.  There is probably a benign hemangioma involving the superior, posterior right lobe of the liver (posterior segment), measuring about 2.7 cm in greatest axial diameter, as seen on image 34 of series 204 and adjacent images; this finding was seen on the 3/2/2006 CT study.  It is likely benign.  Degenerative changes are seen throughout the imaged spine, especially at L5-S1.  There may be diffuse idiopathic skeletal hyperostosis (DISH).  Degenerative changes involve the bilateral hip joints and, to a greater degree, the bilateral sacroiliac (SI) joints.  No acute fracture.  No aggressive osseous lesion is suggested.        There is an indeterminate 2 cm upper pole left renal cyst, not clearly seen on prior relevant imaging studies at [facility].  Consider imaging follow-up of this finding to ensure a benign progression if clinically warranted.  For instance, nonenhanced and enhanced abdominal MRI examination (with renal protocol) may be helpful in further imaging assessment of this finding if clinically warranted and if not contraindicated.  Urology consultation may be helpful for further  assessment, management, and treatment options.  Other indeterminate renal cysts are possible.  Otherwise, no acute findings are seen.  Please see above comments for further detail.    Please note that portions of this note were completed with a voice recognition program.  ADRIAN BENAVIDES JR, MD       Electronically Signed and Approved By: ADRIAN BENAVIDES JR, MD on 5/21/2023 at 3:22              US Gallbladder    Result Date: 5/21/2023  PROCEDURE: US GALLBLADDER  COMPARISON: None.  INDICATIONS: Right upper quadrant abdominal pain.  Elevated serum lipase.  TECHNIQUE: A limited abdominal ultrasound examination of the right upper quadrant was performed, tailored in order to evaluate the gallbladder and the biliary tree.    INDICATIONS: RIGHT ABDOMINAL PAIN.  FINDINGS:  Two-dimensional grayscale images as well as color and spectral Doppler analysis are provided for review. The patient was fasting as per protocol for the study. No gallstones or acute cholecystitis are seen. No gallbladder wall thickening. No pericholecystic fluid.  There is diffuse hepatic steatosis with possible hepatomegaly.  The maximum craniocaudal dimension of the right lobe of the liver is approximately 17.4 cm.  Otherwise, no focal abnormality is appreciated involving the liver by ultrasound examination.  No focal abnormalities are seen involving the right kidney. The pancreas is obscured by bowel gas. Its visualized portions are unremarkable. Doppler interrogation of the hepatic vasculature reveals patent vessels with normal blood flow direction. The common bile duct measures 1.9 mm in diameter. No biliary ductal dilatation is seen. No choledocholithiasis. The [id number]-[id number] length of the right kidney is 10.2 cm. No right hydronephrosis. The left kidney, spleen, inferior vena cava, and abdominal aorta were not evaluated. A negative sonographic Meyers's sign was reported.  Again, bowel gas obscures detail on the study.        No acute cholecystitis. No  gallstones. No biliary ductal dilatation.  There is diffuse hepatic steatosis.  The other findings are as detailed above.    Please note that portions of this note were completed with a voice recognition program.  ADRIAN BENAVIDES JR, MD       Electronically Signed and Approved By: ADRIAN BENAVIDES JR, MD on 5/21/2023 at 1:58                  Differential Diagnosis and Discussion:      Abdominal Pain: Based on the patient's signs and symptoms, I considered abdominal aortic aneurysm, small bowel obstruction, pancreatitis, acute cholecystitis, acute appendecitis, peptic ulcer disease, gastritis, colitis, endocrine disorders, irritable bowel syndrome and other differential diagnosis an etiology of the patient's abdominal pain.    All labs were reviewed and interpreted by me.  CT scan radiology impression was interpreted by me.  Ultrasound impression was interpreted by me.     MDM  Number of Diagnoses or Management Options  Pain of upper abdomen  Renal cyst, left  Diagnosis management comments: Patient's vital signs are stable in the emergency room.  The patient was afebrile    The patient's urine was negative for blood or infection    The patient's CMP was reviewed and shows no abnormalities of critical concern.  Of note, the patient's sodium and potassium are acceptable.  The patient's liver enzymes are unremarkable.  The patient's renal function including creatinine is preserved.  The patient has a normal anion gap.    The patient's lactate was normal    The patient's CBC was reviewed and shows no abnormalities of critical concern.  Of note, there is no anemia requiring a blood transfusion and the platelet count is acceptable    Patient had a lipase that was elevated mildly    Ultrasound of the gallbladder was performed to rule out gallstone pancreatitis.  The patient's ultrasound demonstrated no evidence of acute cholecystitis, gallstones or biliary ductal dilatation    CT scan of the abdomen pelvis was performed due to  the elevated lipase.  There is no obvious acute abnormalities on the CT scan.  There was an indeterminate 2 cm upper pole left renal cyst.  MRI or urology on an outpatient basis was recommended by the radiologist.  I have discussed this in detail with the patient.  The patient understands the importance of reviewing the CT scan with her primary care provider and either ED given MRI of the left kidney or urology referral.  They will follow-up with her primary care provider    The patient is resting comfortably and feels better, is alert and in no distress.  Repeat examination is unremarkable and benign; in particular, there is no discomfort at McBurney's point and there is no pulsatile mass.  The history, exam, diagnostic testing and current condition does not suggest acute appendicitis, bowel obstruction, acute cholecystitis bowel perforation, major gastrointestinal bleeding, severe diverticulitis, abdominal aortic aneurysm, mesenteric ischemia, volvulus, sepsis or other significant pathology that warrants further testing, continued ED treatment, admission for surgical evaluation at this point.  The vital signs have been stable.  The patient does not have uncontrolled pain intractable vomiting or other significant symptoms.  The patient's condition is stable and appropriate for discharge from the emergency department.  The patient will follow up with her primary care physician for serial reexamination of the abdomen tomorrow.  The patient was instructed to return should they have worsening pain, intractable vomiting, fever or new symptoms       Amount and/or Complexity of Data Reviewed  Clinical lab tests: reviewed  Tests in the radiology section of CPT®: reviewed               Social Determinants of Health:    Patient is independent, reliable, and has access to care.       Disposition and Care Coordination:    Discharged: I considered escalation of care by admitting this patient for observation, however the patient  has improved and is suitable and  stable for discharge.    I have explained discharge medications and the need for follow up with the patient/caretakers. This was also printed in the discharge instructions. Patient was discharged with the following medications and follow up:      Medication List      New Prescriptions    ondansetron ODT 4 MG disintegrating tablet  Commonly known as: ZOFRAN-ODT  Place 2 tablets on the tongue Every 8 (Eight) Hours As Needed for Nausea or Vomiting for up to 5 days.     pantoprazole 40 MG EC tablet  Commonly known as: PROTONIX  Take 1 tablet by mouth Daily for 30 days.           Where to Get Your Medications      These medications were sent to [id number] [place] [facility] [address] AT [facility] - [phone]  - [phone]   [address] [place] [facility] [address]    Phone: [phone]   · ondansetron ODT 4 MG disintegrating tablet  · pantoprazole 40 MG EC tablet      Heike Buchanan, APRN  [address]  [address]  [facility] [id number] [phone]    On 5/22/2023  Serial reexamination the abdomen, call for appointment       Final diagnoses:   Pain of upper abdomen   Renal cyst, left        ED Disposition     ED Disposition   Discharge    Condition   Stable    Comment   --             This medical record created using voice recognition software.           Marcus Burnette DO  05/21/23 0333

## 2023-05-21 NOTE — Clinical Note
[facility] EMERGENCY ROOM  [address] [place] [address]  Phone: [phone]    Ann Marie Bunch was seen and treated in our emergency department on 5/20/2023.  She may return to work on 05/23/2023.         Thank you for choosing [facility].    Marcus Burnette DO

## 2023-05-21 NOTE — DISCHARGE INSTRUCTIONS
Liquid diet only for the next 24 hours and then advance your diet very slowly as tolerated    Please push oral fluids    Please discuss possible need for gastroenterology referral and EGD with your primary care physician    Please follow-up with your doctor tomorrow for serial reexamination the abdomen.  Return to the emergency room immediately for intractable pain, intractable vomiting, fever, shaking chills, muscle aches, near passing out, passing out or any new symptoms you are concerned with    Please review the CAT scan of the abdomen pelvis that was performed today with your primary care physician.  Please discuss the cyst or abnormality in the left kidney.  Please discuss the need to either perform an MRI of the left kidney or referral to urology to determine benign etiology of the abnormality found in the left kidney

## 2023-06-14 ENCOUNTER — PREP FOR SURGERY (OUTPATIENT)
Dept: UROLOGY | Facility: CLINIC | Age: 58
End: 2023-06-14

## 2023-06-14 ENCOUNTER — OFFICE VISIT (OUTPATIENT)
Dept: UROLOGY | Facility: CLINIC | Age: 58
End: 2023-06-14
Payer: COMMERCIAL

## 2023-06-14 VITALS — WEIGHT: 276.8 LBS | BODY MASS INDEX: 49.04 KG/M2 | HEIGHT: 63 IN

## 2023-06-14 DIAGNOSIS — N28.89 LEFT RENAL MASS: Primary | ICD-10-CM

## 2023-06-14 LAB
BILIRUB BLD-MCNC: NEGATIVE MG/DL
CLARITY, POC: CLEAR
COLOR UR: YELLOW
EXPIRATION DATE: ABNORMAL
GLUCOSE UR STRIP-MCNC: NEGATIVE MG/DL
KETONES UR QL: NEGATIVE
LEUKOCYTE EST, POC: ABNORMAL
Lab: ABNORMAL
NITRITE UR-MCNC: NEGATIVE MG/ML
PH UR: 5.5 [PH] (ref 5–8)
PROT UR STRIP-MCNC: NEGATIVE MG/DL
RBC # UR STRIP: NEGATIVE /UL
SP GR UR: 1.02 (ref 1–1.03)
UROBILINOGEN UR QL: ABNORMAL

## 2023-06-14 RX ORDER — SODIUM CHLORIDE 0.9 % (FLUSH) 0.9 %
10 SYRINGE (ML) INJECTION AS NEEDED
OUTPATIENT
Start: 2023-06-14

## 2023-06-14 RX ORDER — SODIUM CHLORIDE 9 MG/ML
40 INJECTION, SOLUTION INTRAVENOUS AS NEEDED
OUTPATIENT
Start: 2023-06-14

## 2023-06-14 RX ORDER — SODIUM CHLORIDE 9 MG/ML
100 INJECTION, SOLUTION INTRAVENOUS CONTINUOUS
OUTPATIENT
Start: 2023-06-14

## 2023-06-14 RX ORDER — SODIUM CHLORIDE 0.9 % (FLUSH) 0.9 %
10 SYRINGE (ML) INJECTION EVERY 12 HOURS SCHEDULED
OUTPATIENT
Start: 2023-06-14

## 2023-06-14 RX ORDER — SODIUM CHLORIDE 0.9 % (FLUSH) 0.9 %
3 SYRINGE (ML) INJECTION EVERY 12 HOURS SCHEDULED
OUTPATIENT
Start: 2023-06-14

## 2023-06-14 NOTE — ASSESSMENT & PLAN NOTE
She will need a left robotic partial nephrectomy.  Risk benefits of the procedure were discussed in detail with the patient she is agreeable to proceed.  Her tumor is divided in the border between being anterior and posterior and so we did discuss the possibility of doing both a anterior approach as well as a retroperitoneal approach.  We will make that decision at the time of surgery.  She is agreeable to proceed.

## 2023-06-14 NOTE — H&P
"[facility]   Urology HISTORY AND PHYSICAL    Patient Name: Ann Marie Bunch  : 1965  MRN: [id number]  Primary Care Physician:  Heike Buchanan APRN  Date of admission: (Not on file)    Subjective   Subjective     History of Present Illness  Patient has a left upper pole renal mass 2 cm in size and presents for robotic partial nephrectomy.    Personal History     Past Medical History:   Diagnosis Date    Asthma     Chronic obstructive pulmonary disease     Diabetes     H/O shoulder surgery 2017    aftercare following right shoulder arthroscopy     Limb swelling     Low back pain        Past Surgical History:   Procedure Laterality Date    BACK SURGERY      KNEE SURGERY      OTHER SURGICAL HISTORY      joint surgery     SHOULDER ARTHROSCOPY      TONSILLECTOMY         Family History: family history includes Arthritis in her mother; Cancer in her father and sister; Diabetes in her father and sister. Otherwise pertinent FHx was reviewed and not pertinent to current issue.    Social History:  reports that she has quit smoking. She has never used smokeless tobacco. She reports current alcohol use. She reports that she does not use drugs.    Home Medications:  albuterol sulfate HFA, baclofen, benzonatate, cetirizine, famotidine, fluticasone-salmeterol, hydrOXYzine, loratadine, methscopolamine, montelukast, pantoprazole, predniSONE, and pseudoephedrine    Allergies:  Allergies   Allergen Reactions    Aspirin Nausea Only     STATES \"TEARS HER STOMACH UP\" EVEN WITH FOOD    Motrin [Ibuprofen] Swelling    Nsaids Swelling and GI Intolerance       Objective    Objective     Vitals:        Physical Exam  Constitutional:       Appearance: Normal appearance.   Cardiovascular:      Rate and Rhythm: Normal rate and regular rhythm.   Pulmonary:      Effort: Pulmonary effort is normal.      Breath sounds: Normal breath sounds.   Neurological:      Mental Status: She is alert. Mental status is at baseline. "   Psychiatric:         Mood and Affect: Mood and affect normal.         Speech: Speech normal.         Judgment: Judgment normal.       Result Review    Result Review:  I have personally reviewed the results from the time of this admission to 6/14/2023 12:11 EDT and agree with these findings:  [x]  Laboratory  []  Microbiology  [x]  Radiology  []  EKG/Telemetry   []  Cardiology/Vascular   []  Pathology  [x]  Old records  []  Other:      Assessment & Plan   Assessment / Plan       Active Hospital Problems:  There are no active hospital problems to display for this patient.      Plan: Left robotic partial nephrectomy  Risks and benefits discussed with patient and they are agreeable to proceed.    DVT prophylaxis:  No DVT prophylaxis order currently exists.    CODE STATUS:           Electronically signed by Magali Victor MD, 06/14/23, 12:11 PM EDT.

## 2023-06-14 NOTE — PROGRESS NOTES
"Chief Complaint  Renal cyst    Subjective          Ann Marie Bunch presents to [facility]    History of Present Illness  Ms. Little is here for a left renal mass.  She was seen in the ER for some lower abdominal pain and had a renal ultrasound and CT scan which revealed a couple of cyst in her kidneys as well as a complex appearing mass in the left upper pole.  It was approximate 2 cm in size.  She did have a follow-up MRI which revealed a complex enhancing upper pole right renal mass 2 cm in size concerning for renal cell carcinoma.  She did have a couple of other complex cyst in her kidneys but these did not have any enhancing components.  She denies history of kidney cancer.  Currently her abdominal pain is much improved.    Objective   Vital Signs:   Ht 160 cm (63\")   Wt 126 kg (276 lb 12.8 oz)   BMI 49.03 kg/m²       Physical Exam  Vitals and nursing note reviewed.   Constitutional:       Appearance: Normal appearance. She is well-developed.   Pulmonary:      Effort: Pulmonary effort is normal.      Breath sounds: Normal air entry.   Neurological:      Mental Status: She is alert and oriented to person, place, and time.      Motor: Motor function is intact.   Psychiatric:         Mood and Affect: Mood normal.         Behavior: Behavior normal.        Result Review :   The following data was reviewed by: Magali Victor MD on 06/14/2023:    Results for orders placed or performed in visit on 06/14/23   POC Urinalysis Dipstick, Automated    Specimen: Urine   Result Value Ref Range    Color Yellow Yellow, Straw, Dark Yellow, Maryse    Clarity, UA Clear Clear    Specific Gravity  1.025 1.005 - 1.030    pH, Urine 5.5 5.0 - 8.0    Leukocytes Trace (A) Negative    Nitrite, UA Negative Negative    Protein, POC Negative Negative mg/dL    Glucose, UA Negative Negative mg/dL    Ketones, UA Negative Negative    Urobilinogen, UA 0.2 E.U./dL Normal, 0.2 E.U./dL    Bilirubin Negative Negative    " Blood, UA Negative Negative    Lot Number [id number]     Expiration Date 72,024        Today I personally reviewed her CT scan, abdominal ultrasound, and MRI of her abdomen and pelvis.  MRI results and CT scan results were discussed in detail with the patient.  Results are scanned to the chart.             Assessment and Plan    Diagnoses and all orders for this visit:    1. Left renal mass (Primary)  Assessment & Plan:  She will need a left robotic partial nephrectomy.  Risk benefits of the procedure were discussed in detail with the patient she is agreeable to proceed.  Her tumor is divided in the border between being anterior and posterior and so we did discuss the possibility of doing both a anterior approach as well as a retroperitoneal approach.  We will make that decision at the time of surgery.  She is agreeable to proceed.    Orders:  -     POC Urinalysis Dipstick, Automated            Follow Up       No follow-ups on file.  Patient was given instructions and counseling regarding her condition or for health maintenance advice. Please see specific information pulled into the AVS if appropriate.

## 2023-07-18 ENCOUNTER — HOSPITAL ENCOUNTER (OUTPATIENT)
Facility: HOSPITAL | Age: 58
Discharge: HOME | End: 2023-07-19
Attending: UROLOGY | Admitting: UROLOGY
Payer: COMMERCIAL

## 2023-07-18 DIAGNOSIS — Z90.5 S/P NEPHRECTOMY: Primary | ICD-10-CM

## 2023-07-18 DIAGNOSIS — M17.11 PRIMARY OSTEOARTHRITIS OF RIGHT KNEE: ICD-10-CM

## 2023-07-18 DIAGNOSIS — N28.89 LEFT RENAL MASS: ICD-10-CM

## 2023-07-18 LAB
ABO GROUP BLD: NORMAL
BLD GP AB SCN SERPL QL: NEGATIVE
GLUCOSE BLDC GLUCOMTR-MCNC: 136 MG/DL (ref 70–99)
GLUCOSE BLDC GLUCOMTR-MCNC: 89 MG/DL (ref 70–99)
RH BLD: NEGATIVE
T&S EXPIRATION DATE: NORMAL

## 2023-07-18 PROCEDURE — 0 BUPIVACAINE (PF) 0.5 % SOLUTION: Performed by: UROLOGY

## 2023-07-18 PROCEDURE — 86850 RBC ANTIBODY SCREEN: CPT | Performed by: UROLOGY

## 2023-07-18 PROCEDURE — 25010000002 ONDANSETRON PER 1 MG: Performed by: UROLOGY

## 2023-07-18 PROCEDURE — 94640 AIRWAY INHALATION TREATMENT: CPT

## 2023-07-18 PROCEDURE — 25010000002 METOCLOPRAMIDE PER 10 MG: Performed by: ANESTHESIOLOGY

## 2023-07-18 PROCEDURE — 82948 REAGENT STRIP/BLOOD GLUCOSE: CPT

## 2023-07-18 PROCEDURE — 94761 N-INVAS EAR/PLS OXIMETRY MLT: CPT

## 2023-07-18 PROCEDURE — 25010000002 MIDAZOLAM PER 1MG: Performed by: ANESTHESIOLOGY

## 2023-07-18 PROCEDURE — 86900 BLOOD TYPING SEROLOGIC ABO: CPT | Performed by: UROLOGY

## 2023-07-18 PROCEDURE — 25010000002 BUPIVACAINE (PF) 0.5 % SOLUTION: Performed by: UROLOGY

## 2023-07-18 PROCEDURE — 86901 BLOOD TYPING SEROLOGIC RH(D): CPT | Performed by: UROLOGY

## 2023-07-18 PROCEDURE — 50543 LAPARO PARTIAL NEPHRECTOMY: CPT | Performed by: UROLOGY

## 2023-07-18 PROCEDURE — 25010000002 HYDROMORPHONE 1 MG/ML SOLUTION: Performed by: UROLOGY

## 2023-07-18 PROCEDURE — G0378 HOSPITAL OBSERVATION PER HR: HCPCS

## 2023-07-18 PROCEDURE — 94799 UNLISTED PULMONARY SVC/PX: CPT

## 2023-07-18 PROCEDURE — 88307 TISSUE EXAM BY PATHOLOGIST: CPT | Performed by: UROLOGY

## 2023-07-18 PROCEDURE — 63710000001 PROMETHAZINE PER 12.5 MG: Performed by: UROLOGY

## 2023-07-18 PROCEDURE — 94664 DEMO&/EVAL PT USE INHALER: CPT

## 2023-07-18 PROCEDURE — 25010000002 HYDROMORPHONE 1 MG/ML SOLUTION: Performed by: NURSE ANESTHETIST, CERTIFIED REGISTERED

## 2023-07-18 PROCEDURE — 50543 LAPARO PARTIAL NEPHRECTOMY: CPT | Performed by: SPECIALIST/TECHNOLOGIST, OTHER

## 2023-07-18 DEVICE — ABSORBABLE WOUND CLOSURE DEVICE
Type: IMPLANTABLE DEVICE | Site: KIDNEY | Status: FUNCTIONAL
Brand: V-LOC 90

## 2023-07-18 DEVICE — CLIP LIG HEMOLOK PA LG 6CT PRP: Type: IMPLANTABLE DEVICE | Site: KIDNEY | Status: FUNCTIONAL

## 2023-07-18 DEVICE — FLOSEAL HEMOSTATIC MATRIX, 10ML
Type: IMPLANTABLE DEVICE | Site: KIDNEY | Status: FUNCTIONAL
Brand: FLOSEAL HEMOSTATIC MATRIX

## 2023-07-18 DEVICE — ABSORBABLE HEMOSTAT (OXIDIZED REGENERATED CELLULOSE, U.S.P.)
Type: IMPLANTABLE DEVICE | Site: KIDNEY | Status: FUNCTIONAL
Brand: SURGICEL

## 2023-07-18 RX ORDER — ACETAMINOPHEN 325 MG/1
650 TABLET ORAL EVERY 4 HOURS PRN
Status: DISCONTINUED | OUTPATIENT
Start: 2023-07-18 | End: 2023-07-19 | Stop reason: HOSPADM

## 2023-07-18 RX ORDER — SODIUM CHLORIDE 0.9 % (FLUSH) 0.9 %
10 SYRINGE (ML) INJECTION AS NEEDED
Status: DISCONTINUED | OUTPATIENT
Start: 2023-07-18 | End: 2023-07-18 | Stop reason: HOSPADM

## 2023-07-18 RX ORDER — HEPARIN SODIUM 5000 [USP'U]/ML
5000 INJECTION, SOLUTION INTRAVENOUS; SUBCUTANEOUS EVERY 8 HOURS SCHEDULED
Status: DISCONTINUED | OUTPATIENT
Start: 2023-07-19 | End: 2023-07-19 | Stop reason: HOSPADM

## 2023-07-18 RX ORDER — PROMETHAZINE HYDROCHLORIDE 12.5 MG/1
25 TABLET ORAL ONCE AS NEEDED
Status: DISCONTINUED | OUTPATIENT
Start: 2023-07-18 | End: 2023-07-18 | Stop reason: HOSPADM

## 2023-07-18 RX ORDER — MIDAZOLAM HYDROCHLORIDE 2 MG/2ML
2 INJECTION, SOLUTION INTRAMUSCULAR; INTRAVENOUS ONCE
Status: COMPLETED | OUTPATIENT
Start: 2023-07-18 | End: 2023-07-18

## 2023-07-18 RX ORDER — SODIUM CHLORIDE 9 MG/ML
40 INJECTION, SOLUTION INTRAVENOUS AS NEEDED
Status: DISCONTINUED | OUTPATIENT
Start: 2023-07-18 | End: 2023-07-18 | Stop reason: HOSPADM

## 2023-07-18 RX ORDER — SODIUM CHLORIDE 0.9 % (FLUSH) 0.9 %
10 SYRINGE (ML) INJECTION EVERY 12 HOURS SCHEDULED
Status: DISCONTINUED | OUTPATIENT
Start: 2023-07-18 | End: 2023-07-18 | Stop reason: HOSPADM

## 2023-07-18 RX ORDER — ACETAMINOPHEN 500 MG
1000 TABLET ORAL ONCE
Status: COMPLETED | OUTPATIENT
Start: 2023-07-18 | End: 2023-07-18

## 2023-07-18 RX ORDER — MONTELUKAST SODIUM 10 MG/1
10 TABLET ORAL NIGHTLY
Status: DISCONTINUED | OUTPATIENT
Start: 2023-07-18 | End: 2023-07-19 | Stop reason: HOSPADM

## 2023-07-18 RX ORDER — SIMETHICONE 80 MG
80 TABLET,CHEWABLE ORAL 4 TIMES DAILY PRN
Status: DISCONTINUED | OUTPATIENT
Start: 2023-07-18 | End: 2023-07-19 | Stop reason: HOSPADM

## 2023-07-18 RX ORDER — AMOXICILLIN 250 MG
2 CAPSULE ORAL 2 TIMES DAILY
Status: DISCONTINUED | OUTPATIENT
Start: 2023-07-18 | End: 2023-07-19 | Stop reason: HOSPADM

## 2023-07-18 RX ORDER — BACLOFEN 10 MG/1
10 TABLET ORAL 3 TIMES DAILY PRN
Status: DISCONTINUED | OUTPATIENT
Start: 2023-07-18 | End: 2023-07-19 | Stop reason: HOSPADM

## 2023-07-18 RX ORDER — ALBUTEROL SULFATE 90 UG/1
2 AEROSOL, METERED RESPIRATORY (INHALATION)
Status: DISCONTINUED | OUTPATIENT
Start: 2023-07-18 | End: 2023-07-19 | Stop reason: HOSPADM

## 2023-07-18 RX ORDER — PSEUDOEPHEDRINE HCL 30 MG
30 TABLET ORAL EVERY 6 HOURS PRN
Status: DISCONTINUED | OUTPATIENT
Start: 2023-07-18 | End: 2023-07-19 | Stop reason: HOSPADM

## 2023-07-18 RX ORDER — MEPERIDINE HYDROCHLORIDE 25 MG/ML
12.5 INJECTION INTRAMUSCULAR; INTRAVENOUS; SUBCUTANEOUS
Status: DISCONTINUED | OUTPATIENT
Start: 2023-07-18 | End: 2023-07-18 | Stop reason: HOSPADM

## 2023-07-18 RX ORDER — SODIUM CHLORIDE 9 MG/ML
100 INJECTION, SOLUTION INTRAVENOUS CONTINUOUS
Status: DISCONTINUED | OUTPATIENT
Start: 2023-07-18 | End: 2023-07-18

## 2023-07-18 RX ORDER — CETIRIZINE HYDROCHLORIDE 10 MG/1
10 TABLET ORAL DAILY
Status: DISCONTINUED | OUTPATIENT
Start: 2023-07-18 | End: 2023-07-19 | Stop reason: HOSPADM

## 2023-07-18 RX ORDER — ONDANSETRON 2 MG/ML
4 INJECTION INTRAMUSCULAR; INTRAVENOUS ONCE AS NEEDED
Status: DISCONTINUED | OUTPATIENT
Start: 2023-07-18 | End: 2023-07-18 | Stop reason: HOSPADM

## 2023-07-18 RX ORDER — SODIUM CHLORIDE, SODIUM LACTATE, POTASSIUM CHLORIDE, CALCIUM CHLORIDE 600; 310; 30; 20 MG/100ML; MG/100ML; MG/100ML; MG/100ML
100 INJECTION, SOLUTION INTRAVENOUS CONTINUOUS
Status: DISCONTINUED | OUTPATIENT
Start: 2023-07-18 | End: 2023-07-19 | Stop reason: HOSPADM

## 2023-07-18 RX ORDER — SODIUM CHLORIDE 0.9 % (FLUSH) 0.9 %
3 SYRINGE (ML) INJECTION EVERY 12 HOURS SCHEDULED
Status: DISCONTINUED | OUTPATIENT
Start: 2023-07-18 | End: 2023-07-18 | Stop reason: HOSPADM

## 2023-07-18 RX ORDER — ARFORMOTEROL TARTRATE 15 UG/2ML
15 SOLUTION RESPIRATORY (INHALATION)
Status: DISCONTINUED | OUTPATIENT
Start: 2023-07-18 | End: 2023-07-19 | Stop reason: HOSPADM

## 2023-07-18 RX ORDER — ONDANSETRON 2 MG/ML
4 INJECTION INTRAMUSCULAR; INTRAVENOUS EVERY 6 HOURS PRN
Status: DISCONTINUED | OUTPATIENT
Start: 2023-07-18 | End: 2023-07-19 | Stop reason: HOSPADM

## 2023-07-18 RX ORDER — OXYCODONE HCL 5 MG/5 ML
5 SOLUTION, ORAL ORAL EVERY 4 HOURS PRN
Status: DISCONTINUED | OUTPATIENT
Start: 2023-07-18 | End: 2023-07-18 | Stop reason: HOSPADM

## 2023-07-18 RX ORDER — ONDANSETRON 4 MG/1
4 TABLET, FILM COATED ORAL EVERY 6 HOURS PRN
Status: DISCONTINUED | OUTPATIENT
Start: 2023-07-18 | End: 2023-07-19 | Stop reason: HOSPADM

## 2023-07-18 RX ORDER — BUPIVACAINE HYDROCHLORIDE 5 MG/ML
INJECTION, SOLUTION EPIDURAL; INTRACAUDAL AS NEEDED
Status: DISCONTINUED | OUTPATIENT
Start: 2023-07-18 | End: 2023-07-18 | Stop reason: HOSPADM

## 2023-07-18 RX ORDER — NALOXONE HCL 0.4 MG/ML
0.1 VIAL (ML) INJECTION
Status: DISCONTINUED | OUTPATIENT
Start: 2023-07-18 | End: 2023-07-19 | Stop reason: HOSPADM

## 2023-07-18 RX ORDER — MAGNESIUM HYDROXIDE 1200 MG/15ML
LIQUID ORAL AS NEEDED
Status: DISCONTINUED | OUTPATIENT
Start: 2023-07-18 | End: 2023-07-18 | Stop reason: HOSPADM

## 2023-07-18 RX ORDER — ACETAMINOPHEN 650 MG/1
650 SUPPOSITORY RECTAL EVERY 4 HOURS PRN
Status: DISCONTINUED | OUTPATIENT
Start: 2023-07-18 | End: 2023-07-19 | Stop reason: HOSPADM

## 2023-07-18 RX ORDER — PROMETHAZINE HYDROCHLORIDE 25 MG/1
25 SUPPOSITORY RECTAL ONCE AS NEEDED
Status: DISCONTINUED | OUTPATIENT
Start: 2023-07-18 | End: 2023-07-18 | Stop reason: HOSPADM

## 2023-07-18 RX ORDER — SENNOSIDES 8.6 MG
1300 CAPSULE ORAL EVERY 8 HOURS PRN
COMMUNITY

## 2023-07-18 RX ORDER — BENZONATATE 100 MG/1
200 CAPSULE ORAL 3 TIMES DAILY PRN
Status: DISCONTINUED | OUTPATIENT
Start: 2023-07-18 | End: 2023-07-19 | Stop reason: HOSPADM

## 2023-07-18 RX ORDER — METOCLOPRAMIDE HYDROCHLORIDE 5 MG/ML
10 INJECTION INTRAMUSCULAR; INTRAVENOUS ONCE AS NEEDED
Status: COMPLETED | OUTPATIENT
Start: 2023-07-18 | End: 2023-07-18

## 2023-07-18 RX ORDER — BUDESONIDE 0.25 MG/2ML
0.25 INHALANT ORAL
Status: DISCONTINUED | OUTPATIENT
Start: 2023-07-18 | End: 2023-07-19 | Stop reason: HOSPADM

## 2023-07-18 RX ORDER — LEVOFLOXACIN 5 MG/ML
500 INJECTION, SOLUTION INTRAVENOUS ONCE
Status: COMPLETED | OUTPATIENT
Start: 2023-07-18 | End: 2023-07-18

## 2023-07-18 RX ORDER — PROMETHAZINE HYDROCHLORIDE 12.5 MG/1
12.5 TABLET ORAL EVERY 6 HOURS PRN
Status: DISCONTINUED | OUTPATIENT
Start: 2023-07-18 | End: 2023-07-19 | Stop reason: HOSPADM

## 2023-07-18 RX ORDER — SODIUM CHLORIDE, SODIUM LACTATE, POTASSIUM CHLORIDE, CALCIUM CHLORIDE 600; 310; 30; 20 MG/100ML; MG/100ML; MG/100ML; MG/100ML
9 INJECTION, SOLUTION INTRAVENOUS CONTINUOUS PRN
Status: DISCONTINUED | OUTPATIENT
Start: 2023-07-18 | End: 2023-07-18 | Stop reason: HOSPADM

## 2023-07-18 RX ORDER — OXYCODONE HYDROCHLORIDE AND ACETAMINOPHEN 5; 325 MG/1; MG/1
2 TABLET ORAL EVERY 4 HOURS PRN
Status: DISCONTINUED | OUTPATIENT
Start: 2023-07-18 | End: 2023-07-19 | Stop reason: HOSPADM

## 2023-07-18 RX ADMIN — OXYCODONE AND ACETAMINOPHEN 2 TABLET: 5; 325 TABLET ORAL at 20:33

## 2023-07-18 RX ADMIN — ARFORMOTEROL TARTRATE 15 MCG: 15 SOLUTION RESPIRATORY (INHALATION) at 20:29

## 2023-07-18 RX ADMIN — OXYCODONE AND ACETAMINOPHEN 2 TABLET: 5; 325 TABLET ORAL at 13:34

## 2023-07-18 RX ADMIN — SODIUM CHLORIDE, POTASSIUM CHLORIDE, SODIUM LACTATE AND CALCIUM CHLORIDE 100 ML/HR: 600; 310; 30; 20 INJECTION, SOLUTION INTRAVENOUS at 13:08

## 2023-07-18 RX ADMIN — MONTELUKAST 10 MG: 10 TABLET, FILM COATED ORAL at 20:32

## 2023-07-18 RX ADMIN — MIDAZOLAM HYDROCHLORIDE 2 MG: 1 INJECTION, SOLUTION INTRAMUSCULAR; INTRAVENOUS at 07:18

## 2023-07-18 RX ADMIN — ACETAMINOPHEN 1000 MG: 500 TABLET ORAL at 07:16

## 2023-07-18 RX ADMIN — BUDESONIDE 0.25 MG: 0.25 SUSPENSION RESPIRATORY (INHALATION) at 20:27

## 2023-07-18 RX ADMIN — SODIUM CHLORIDE, POTASSIUM CHLORIDE, SODIUM LACTATE AND CALCIUM CHLORIDE 9 ML/HR: 600; 310; 30; 20 INJECTION, SOLUTION INTRAVENOUS at 07:16

## 2023-07-18 RX ADMIN — PROMETHAZINE HYDROCHLORIDE 12.5 MG: 12.5 TABLET ORAL at 17:42

## 2023-07-18 RX ADMIN — ARFORMOTEROL TARTRATE 15 MCG: 15 SOLUTION RESPIRATORY (INHALATION) at 13:13

## 2023-07-18 RX ADMIN — SENNOSIDES AND DOCUSATE SODIUM 2 TABLET: 50; 8.6 TABLET ORAL at 20:32

## 2023-07-18 RX ADMIN — BUDESONIDE 0.25 MG: 0.25 SUSPENSION RESPIRATORY (INHALATION) at 13:13

## 2023-07-18 RX ADMIN — HYDROMORPHONE HYDROCHLORIDE 1 MG: 1 INJECTION, SOLUTION INTRAMUSCULAR; INTRAVENOUS; SUBCUTANEOUS at 12:21

## 2023-07-18 RX ADMIN — ONDANSETRON 4 MG: 2 INJECTION INTRAMUSCULAR; INTRAVENOUS at 12:21

## 2023-07-18 RX ADMIN — OXYCODONE HYDROCHLORIDE 5 MG: 5 SOLUTION ORAL at 11:01

## 2023-07-18 RX ADMIN — SODIUM CHLORIDE, POTASSIUM CHLORIDE, SODIUM LACTATE AND CALCIUM CHLORIDE 100 ML/HR: 600; 310; 30; 20 INJECTION, SOLUTION INTRAVENOUS at 20:33

## 2023-07-18 RX ADMIN — HYDROMORPHONE HYDROCHLORIDE 0.5 MG: 1 INJECTION, SOLUTION INTRAMUSCULAR; INTRAVENOUS; SUBCUTANEOUS at 11:01

## 2023-07-18 RX ADMIN — SIMETHICONE 80 MG: 80 TABLET, CHEWABLE ORAL at 15:33

## 2023-07-18 RX ADMIN — HYDROMORPHONE HYDROCHLORIDE 1 MG: 1 INJECTION, SOLUTION INTRAMUSCULAR; INTRAVENOUS; SUBCUTANEOUS at 16:17

## 2023-07-18 RX ADMIN — METOCLOPRAMIDE HYDROCHLORIDE 10 MG: 5 INJECTION INTRAMUSCULAR; INTRAVENOUS at 07:17

## 2023-07-18 RX ADMIN — ONDANSETRON 4 MG: 2 INJECTION INTRAMUSCULAR; INTRAVENOUS at 20:33

## 2023-07-18 RX ADMIN — HYDROMORPHONE HYDROCHLORIDE 0.5 MG: 1 INJECTION, SOLUTION INTRAMUSCULAR; INTRAVENOUS; SUBCUTANEOUS at 10:45

## 2023-07-18 RX ADMIN — TIOTROPIUM BROMIDE INHALATION SPRAY 2 PUFF: 3.12 SPRAY, METERED RESPIRATORY (INHALATION) at 20:29

## 2023-07-18 NOTE — OP NOTE
NEPHRECTOMY PARTIAL RETROPERITONEAL LAPAROSCOPIC WITH DAVINCI ROBOT  Procedure Report    Patient Name:  Ann Marie Bunch  YOB: 1965    Date of Surgery:  7/18/2023     Pre-op Diagnosis:   Left renal mass [N28.89]       Post-Op Diagnosis Codes:     * Left renal mass [N28.89]      Procedure/CPT® Codes:    Procedure(s):  LEFT ROBOTIC PARTIAL NEPHRECTOMY      Staff:  Surgeon(s):  Magali Victor MD    Assistant: Memo Gann RN CSA    Anesthesia: General    Estimated Blood Loss: 200 mL    Implants:    Implant Name Type Inv. Item Serial No.  Lot No. LRB No. Used Action   CLIP LIG HEMOLOK PA LG 6CT PRP - [id number] Implant CLIP LIG HEMOLOK PA LG 6CT PRP  [employer] [id number] Left 1 Implanted   KT SEAL HEMOS ABS FLOSEAL MATRX FAST/PREP 10ML - [id number] Implant KT SEAL HEMOS ABS FLOSEAL MATRX FAST/PREP 10ML  [employer] [id number] Left 1 Implanted   DEV CLS WND VLOC/90 CORWIN ABS 1/2CIR SZ3/0 17MM 23CM SUHAS - [id number] Implant DEV CLS WND VLOC/90 CORWIN ABS 1/2CIR SZ3/0 17MM 23CM SUHAS  [employer] [id number] Left 1 Implanted   HEMOST ABS SURGICEL 2X3 - [id number] Implant HEMOST ABS SURGICEL 2X3  [employer]  [employer] J [id number] Left 1 Implanted   HEMOST ABS SURGICEL 2X14 - [id number] Implant HEMOST ABS SURGICEL 2X14  [employer]  DIV OF J AND J [id number] Left 3 Implanted       Specimen:          Specimens       ID Source Type Tests Collected By Collected At Frozen?    A Kidney, Left Tissue TISSUE PATHOLOGY EXAM   Magali Victor MD 7/18/23 0942 No    Description: LEFT RENAL MASS                Complications: None    Description of Procedure:     After appropriate consent was obtained, the patient was taken to the operating room.  After induction of general anesthesia the patient was placed in the right lateral decubitus position with the left side up.  Care was taken to pad all bony prominences to prevent neurological and vascular injury.      An 8mm trocar was placed in the left  upper quadrant, just lateral to the umbilicus.  A Veress needle was inserted through this and the abdomen was insufflated.  At this point, a 5 mm direct vision port was placed.  All other ports were placed under direct vision.  8 mm trocar was placed in the left upper quadrant and 8 mm trocar in the left lower quadrant and a 10 mm trocar just above the midline in the left upper quadrant.      The robot was then docked in the standard fashion.  The colon was taken down off of the left kidney using sharp dissection.  Once the colon was reflected off, I located the lower pole of the kidney and the left ureter and elevated that. I then followed the left ureter up toward the left renal hilum.  There was a left gonadal vein, which we left in place.  Following that up further, I encountered the left renal artery and the left renal vein.  The left renal vein was just inferior o the left renal artery.  I cleaned off the left renal artery to prepare for clamping.      At this point, Gerota's fascia was taken off the left upper pole until the mass was noted and I did leave Gerota's fascia overlying the mass itself.  The tumor was then marked around the edge scoring for resection.  At this time, a bulldog clamp was placed across the left renal artery.      The mass was then excised in cold fashion using scissors.  All margins appeared clear.  There was a small area with branch of the collecting system and two bleeding vessels on the resection bed.  These were sutured using Vicryl sutures.  At this point, the edges of the resection bed were cauterized using the monopolar cautery.  FloSeal was placed into the resection bed and two Surgicel bolsters were placed on that.  I then placed 4 stay-sutures to hold the bolsters in place.  These were tightened down.  At this point, the bulldog clamp on the left renal artery was removed.  Total clamp time was 16 minutes and 42 seconds.      There was no evidence of bleeding at the renal  hilum with removal of the clamp.  There was no evidence of any further bleeding at the resection bed, either.  The clamps were then tightened down on the bed and LAPRA-TYs were placed. Surgicel blanket was then placed on top of that and the rest of the FloSeal was used, as well.      The patient tolerated the procedure well.  The left partial nephrectomy specimen was placed into the bag.  Robot was then undocked in the standard fashion.  The cyst was removed through the 10 mm port in the left lower quadrant.  A Nathaniel-Tate Endo Close was then used to close the 10 mm trocar port. All the rest of the ports were removed.  The abdomen was desufflated.     The patient tolerated the procedure well.  Skin was closed using 4-0 Monocryl sutures.  The patient was transferred to the post-anesthesia care unit in satisfactory condition with stable vital signs.     All counts were correct at the end of the procedure.       Magali Victor MD     Date: 7/18/2023  Time: 11:13 EDT

## 2023-07-18 NOTE — H&P
"[facility]   Urology HISTORY AND PHYSICAL    Patient Name: Ann Marie Bunch  : 1965  MRN: [id number]  Primary Care Physician:  Heike Buchanan APRN  Date of admission: 2023    Subjective   Subjective     History of Present Illness  Patient has a left upper pole renal mass 2 cm in size and presents for robotic partial nephrectomy.    Personal History     Past Medical History:   Diagnosis Date    Asthma     Chronic obstructive pulmonary disease     Cyst of kidney, acquired     bilateral    Diabetes     diet controlled    Hypertension     monitoring, no meds    Left kidney mass     Limb swelling     Low back pain     ddd    Seasonal allergies     allergy shots 2x weekly       Past Surgical History:   Procedure Laterality Date    BACK SURGERY      discectomy lumbar spine x2    ENDOMETRIAL ABLATION      HAMMER TOE REPAIR Bilateral     KNEE SURGERY Right     knee arthroscopy    LAPAROSCOPIC TUBAL LIGATION      PLANTAR FASCIA SURGERY Bilateral     SHOULDER ARTHROSCOPY Right     TONSILLECTOMY         Family History: family history includes Arthritis in her mother; Cancer in her father and sister; Diabetes in her father and sister. Otherwise pertinent FHx was reviewed and not pertinent to current issue.    Social History:  reports that she has quit smoking. She has never used smokeless tobacco. She reports current alcohol use. She reports that she does not use drugs.    Home Medications:  acetaminophen, albuterol, albuterol sulfate HFA, baclofen, benzonatate, fluticasone-salmeterol, loratadine, methscopolamine, montelukast, pseudoephedrine, and tiotropium bromide monohydrate    Allergies:  Allergies   Allergen Reactions    Motrin [Ibuprofen] Nausea And Vomiting and Swelling    Nsaids Swelling and GI Intolerance    Aspirin Nausea Only and Swelling     STATES \"TEARS HER STOMACH UP\" EVEN WITH FOOD    Adhesive Tape Other (See Comments)     Redness and bruising       Objective    Objective     Vitals: "   Temp:  [98 °F (36.7 °C)] 98 °F (36.7 °C)  Heart Rate:  [69] 69  Resp:  [20] 20  BP: (150)/(68) 150/68    Physical Exam  Constitutional:       Appearance: Normal appearance.   Cardiovascular:      Rate and Rhythm: Normal rate and regular rhythm.   Pulmonary:      Effort: Pulmonary effort is normal.      Breath sounds: Normal breath sounds.   Neurological:      Mental Status: She is alert. Mental status is at baseline.   Psychiatric:         Mood and Affect: Mood and affect normal.         Speech: Speech normal.         Judgment: Judgment normal.       Result Review    Result Review:  I have personally reviewed the results from the time of this admission to 7/18/2023 07:28 EDT and agree with these findings:  [x]  Laboratory  []  Microbiology  [x]  Radiology  []  EKG/Telemetry   []  Cardiology/Vascular   []  Pathology  [x]  Old records  []  Other:      Assessment & Plan   Assessment / Plan       Active Hospital Problems:  Active Hospital Problems    Diagnosis     **Left renal mass          Plan: Left robotic partial nephrectomy  Risks and benefits discussed with patient and they are agreeable to proceed.    DVT prophylaxis:  No DVT prophylaxis order currently exists.    CODE STATUS:

## 2023-07-18 NOTE — PLAN OF CARE
Goal Outcome Evaluation:  Plan of Care Reviewed With: patient        Progress: improving          Patient arrived from PACU. Complaints of pain and nausea. Mild relief with PRN medication. Family at bedside. Bed in lowest locked position with call light and tray table within reach.

## 2023-07-19 ENCOUNTER — TELEPHONE (OUTPATIENT)
Dept: UROLOGY | Facility: CLINIC | Age: 58
End: 2023-07-19
Payer: COMMERCIAL

## 2023-07-19 VITALS
HEIGHT: 63 IN | RESPIRATION RATE: 18 BRPM | DIASTOLIC BLOOD PRESSURE: 53 MMHG | HEART RATE: 92 BPM | OXYGEN SATURATION: 90 % | WEIGHT: 267.42 LBS | TEMPERATURE: 98.1 F | SYSTOLIC BLOOD PRESSURE: 124 MMHG | BODY MASS INDEX: 47.38 KG/M2

## 2023-07-19 LAB
ANION GAP SERPL CALCULATED.3IONS-SCNC: 10.1 MMOL/L (ref 5–15)
BUN SERPL-MCNC: 16 MG/DL (ref 8–23)
BUN/CREAT SERPL: 18.6 (ref 7–25)
CALCIUM SPEC-SCNC: 9.2 MG/DL (ref 8.6–10.5)
CHLORIDE SERPL-SCNC: 103 MMOL/L (ref 98–107)
CO2 SERPL-SCNC: 27.9 MMOL/L (ref 22–29)
CREAT SERPL-MCNC: 0.86 MG/DL (ref 0.57–1)
CYTO UR: NORMAL
DEPRECATED RDW RBC AUTO: 48.7 FL (ref 37–54)
EGFRCR SERPLBLD CKD-EPI 2021: 77.5 ML/MIN/1.73
ERYTHROCYTE [DISTWIDTH] IN BLOOD BY AUTOMATED COUNT: 14 % (ref 12.3–15.4)
GLUCOSE SERPL-MCNC: 116 MG/DL (ref 65–99)
HCT VFR BLD AUTO: 38.4 % (ref 34–46.6)
HGB BLD-MCNC: 12.1 G/DL (ref 12–15.9)
LAB AP CASE REPORT: NORMAL
LAB AP CLINICAL INFORMATION: NORMAL
LAB AP SYNOPTIC CHECKLIST: NORMAL
MCH RBC QN AUTO: 30 PG (ref 26.6–33)
MCHC RBC AUTO-ENTMCNC: 31.5 G/DL (ref 31.5–35.7)
MCV RBC AUTO: 95.3 FL (ref 79–97)
PATH REPORT.FINAL DX SPEC: NORMAL
PATH REPORT.GROSS SPEC: NORMAL
PLATELET # BLD AUTO: 175 10*3/MM3 (ref 140–450)
PMV BLD AUTO: 10.2 FL (ref 6–12)
POTASSIUM SERPL-SCNC: 4.4 MMOL/L (ref 3.5–5.2)
RBC # BLD AUTO: 4.03 10*6/MM3 (ref 3.77–5.28)
SODIUM SERPL-SCNC: 141 MMOL/L (ref 136–145)
WBC NRBC COR # BLD: 10.48 10*3/MM3 (ref 3.4–10.8)

## 2023-07-19 PROCEDURE — 25010000002 HEPARIN (PORCINE) PER 1000 UNITS: Performed by: UROLOGY

## 2023-07-19 PROCEDURE — 94799 UNLISTED PULMONARY SVC/PX: CPT

## 2023-07-19 PROCEDURE — 80048 BASIC METABOLIC PNL TOTAL CA: CPT | Performed by: UROLOGY

## 2023-07-19 PROCEDURE — 94664 DEMO&/EVAL PT USE INHALER: CPT

## 2023-07-19 PROCEDURE — 85027 COMPLETE CBC AUTOMATED: CPT | Performed by: UROLOGY

## 2023-07-19 RX ORDER — OXYCODONE HYDROCHLORIDE AND ACETAMINOPHEN 5; 325 MG/1; MG/1
1 TABLET ORAL EVERY 4 HOURS PRN
Qty: 20 TABLET | Refills: 0 | Status: SHIPPED | OUTPATIENT
Start: 2023-07-19 | End: 2023-07-25

## 2023-07-19 RX ORDER — AMOXICILLIN 250 MG
2 CAPSULE ORAL 2 TIMES DAILY
Qty: 30 TABLET | Refills: 1 | Status: SHIPPED | OUTPATIENT
Start: 2023-07-19

## 2023-07-19 RX ADMIN — ACETAMINOPHEN 650 MG: 325 TABLET ORAL at 08:05

## 2023-07-19 RX ADMIN — SODIUM CHLORIDE, POTASSIUM CHLORIDE, SODIUM LACTATE AND CALCIUM CHLORIDE 100 ML/HR: 600; 310; 30; 20 INJECTION, SOLUTION INTRAVENOUS at 06:05

## 2023-07-19 RX ADMIN — HEPARIN SODIUM 5000 UNITS: 5000 INJECTION INTRAVENOUS; SUBCUTANEOUS at 06:05

## 2023-07-19 RX ADMIN — OXYCODONE AND ACETAMINOPHEN 2 TABLET: 5; 325 TABLET ORAL at 06:06

## 2023-07-19 RX ADMIN — BUDESONIDE 0.25 MG: 0.25 SUSPENSION RESPIRATORY (INHALATION) at 06:18

## 2023-07-19 RX ADMIN — SENNOSIDES AND DOCUSATE SODIUM 2 TABLET: 50; 8.6 TABLET ORAL at 08:03

## 2023-07-19 RX ADMIN — CETIRIZINE HYDROCHLORIDE 10 MG: 10 TABLET, FILM COATED ORAL at 08:03

## 2023-07-19 RX ADMIN — ARFORMOTEROL TARTRATE 15 MCG: 15 SOLUTION RESPIRATORY (INHALATION) at 06:18

## 2023-07-19 NOTE — TELEPHONE ENCOUNTER
MARY CALLED FROM PATHOLOGY.  LEFT KIDNEY PARTIAL NEPHRECTOMY:  CLEAR CELL RENAL CELL CARCINOMA, GRADE 2.

## 2023-07-19 NOTE — DISCHARGE SUMMARY
Date of Discharge:  7/19/2023    Discharge Diagnosis: Left renal mass    Presenting Problem/History of Present Illness  Active Hospital Problems    Diagnosis  POA    **Left renal mass [N28.89]  Yes    S/p nephrectomy [Z90.5]  Not Applicable      Resolved Hospital Problems   No resolved problems to display.        Hospital Course  Patient is a 58 y.o. female presented with left renal mass.  She underwent a left robotic partial nephrectomy.  On POD 1 she is tolerating a regular diet, ambulating and pain is controlled.      Procedures Performed    Procedure(s):  NEPHRECTOMY PARTIAL LAPAROSCOPIC WITH DAVINCI ROBOT  -------------------       Consults:   Consults       No orders found for last 30 day(s).              Condition on Discharge:  Good    Vital Signs  Temp:  [96.8 °F (36 °C)-98.3 °F (36.8 °C)] 98.1 °F (36.7 °C)  Heart Rate:  [61-98] 92  Resp:  [11-22] 18  BP: (118-168)/() 124/53    Physical Exam:   Incisions c/d/I, abd soft    Discharge Disposition  Home or Self Care    Discharge Medications     Discharge Medications        New Medications        Instructions Start Date   oxyCODONE-acetaminophen 5-325 MG per tablet  Commonly known as: PERCOCET   1 tablet, Oral, Every 4 Hours PRN      sennosides-docusate 8.6-50 MG per tablet  Commonly known as: PERICOLACE   2 tablets, Oral, 2 Times Daily             Continue These Medications        Instructions Start Date   acetaminophen 650 MG 8 hr tablet  Commonly known as: TYLENOL   1,300 mg, Oral, Every 8 Hours PRN      albuterol sulfate  (90 Base) MCG/ACT inhaler  Commonly known as: PROVENTIL HFA;VENTOLIN HFA;PROAIR HFA   2 puffs, Inhalation, Every 4 Hours PRN      albuterol (5 MG/ML) 0.5% nebulizer solution  Commonly known as: PROVENTIL   1.5 mg, Nebulization, Every 6 Hours PRN, SOLUTION IS 1.25 MG PER 3 ML      baclofen 10 MG tablet  Commonly known as: LIORESAL   Take 1 tablet by mouth 3 (Three) Times a Day As Needed.      benzonatate 200 MG  capsule  Commonly known as: TESSALON   200 mg, Oral, 2 Times Daily PRN      fluticasone-salmeterol 100-50 MCG/DOSE DISKUS  Commonly known as: ADVAIR   Inhale 1 puff Every Night.      loratadine 10 MG tablet  Commonly known as: CLARITIN   Take 1 tablet by mouth Every Night.      methscopolamine 2.5 MG tablet  Commonly known as: PAMINE   Take 1 tablet by mouth Every Night.      montelukast 10 MG tablet  Commonly known as: SINGULAIR   10 mg, Oral, Nightly      pseudoephedrine 120 MG 12 hr tablet  Commonly known as: SUDAFED   120 mg, Oral, Nightly      Spiriva Respimat 2.5 MCG/ACT aerosol solution inhaler  Generic drug: tiotropium bromide monohydrate   2 puffs, Inhalation, Nightly               Discharge Diet: Regular diet    Activity at Discharge: Walking and stairs are both allowed.  Patient should not do anything more strenuous than going up stairs.  Do not lift more than 10 pounds, or a gallon of milk.  Patient may shower starting day of discharge.  Do not rub incisions.  Patient may remove dressings from incisions in 1 to 2 days.  Regular diet with no dietary restrictions.  Drink plenty of fluids.  If you have a Benitez catheter in place, follow instructions given by nurse at the time of discharge.  Call the office if there are any concerns or questions.      Follow-up Appointments  Future Appointments   Date Time Provider Department Center   7/26/2023  9:00 AM Magali Victor MD [facility]   10/2/2023  9:30 AM Bella Ferrara PA-C [facility]S [facility]     Additional Instructions for the Follow-ups that You Need to Schedule       Discharge Follow-up with Specified Provider: Dr. Victor; 1 Week   As directed      To: Dr. Victor    Follow Up: 1 Week    Follow Up Details: [phone]                 Test Results Pending at Discharge  Pending Labs       Order Current Status    Tissue Pathology Exam In process             Magali Victor MD  07/19/23  09:20 EDT    Time: Discharge 10 min

## 2023-07-26 ENCOUNTER — OFFICE VISIT (OUTPATIENT)
Dept: UROLOGY | Facility: CLINIC | Age: 58
End: 2023-07-26
Payer: COMMERCIAL

## 2023-07-26 VITALS — WEIGHT: 268.8 LBS | HEIGHT: 63 IN | BODY MASS INDEX: 47.63 KG/M2

## 2023-07-26 DIAGNOSIS — C64.2 RENAL CANCER, LEFT: Primary | ICD-10-CM

## 2023-07-26 PROBLEM — C64.1 RENAL CANCER, RIGHT: Status: ACTIVE | Noted: 2023-07-26

## 2023-07-26 PROCEDURE — 99024 POSTOP FOLLOW-UP VISIT: CPT | Performed by: UROLOGY

## 2023-07-26 NOTE — PROGRESS NOTES
"Chief Complaint  Left renal mass    Subjective          Ann Marie Bunch presents to [facility] UROLOGY  History of Present Illness  Patient is 1 week status post left robotic partial nephrectomy.  No complaints today.  Incisions are healing well.  Pain is controlled.  We discussed pathology today.    Objective   Vital Signs:   Ht 160 cm (63\")   Wt 122 kg (268 lb 12.8 oz)   BMI 47.62 kg/m²       Physical Exam  Vitals and nursing note reviewed.   Constitutional:       Appearance: Normal appearance. She is well-developed.   Pulmonary:      Effort: Pulmonary effort is normal.      Breath sounds: Normal air entry.   Neurological:      Mental Status: She is alert and oriented to person, place, and time.      Motor: Motor function is intact.   Psychiatric:         Mood and Affect: Mood normal.         Behavior: Behavior normal.        Result Review :   The following data was reviewed by: Magali Victor MD on 07/26/2023:    Results for orders placed or performed during the hospital encounter of 07/18/23   CBC (No Diff)    Specimen: Blood   Result Value Ref Range    WBC 10.48 3.40 - 10.80 10*3/mm3    RBC 4.03 3.77 - 5.28 10*6/mm3    Hemoglobin 12.1 12.0 - 15.9 g/dL    Hematocrit 38.4 34.0 - 46.6 %    MCV 95.3 79.0 - 97.0 fL    MCH 30.0 26.6 - 33.0 pg    MCHC 31.5 31.5 - 35.7 g/dL    RDW 14.0 12.3 - 15.4 %    RDW-SD 48.7 37.0 - 54.0 fl    MPV 10.2 6.0 - 12.0 fL    Platelets 175 140 - 450 10*3/mm3   Basic Metabolic Panel    Specimen: Blood   Result Value Ref Range    Glucose 116 (H) 65 - 99 mg/dL    BUN 16 8 - 23 mg/dL    Creatinine 0.86 0.57 - 1.00 mg/dL    Sodium 141 136 - 145 mmol/L    Potassium 4.4 3.5 - 5.2 mmol/L    Chloride 103 98 - 107 mmol/L    CO2 27.9 22.0 - 29.0 mmol/L    Calcium 9.2 8.6 - 10.5 mg/dL    BUN/Creatinine Ratio 18.6 7.0 - 25.0    Anion Gap 10.1 5.0 - 15.0 mmol/L    eGFR 77.5 >60.0 mL/min/1.73   POC Glucose Once    Specimen: Blood   Result Value Ref Range    Glucose 89 70 - 99 " mg/dL   POC Glucose Once    Specimen: Blood   Result Value Ref Range    Glucose 136 (H) 70 - 99 mg/dL   Type & Screen    Specimen: Arm, Left; Blood   Result Value Ref Range    ABO Type A     RH type Negative     Antibody Screen Negative     T&S Expiration Date 7/25/2023 11:59:00 PM    Tissue Pathology Exam    Specimen: Kidney, Left; Tissue   Result Value Ref Range    Case Report       Surgical Pathology Report                         Case: [id number]                                  Authorizing Provider:  Magali Victor MD      Collected:           07/18/2023 09:42 AM          Ordering Location:     [facility] MAIN Received:            07/18/2023 11:05 AM                                 OR                                                                           Pathologist:           Heike Roy MD                                                     Specimen:    Kidney, Left, LEFT RENAL MASS                                                              Clinical Information       Left renal mass      Final Diagnosis       Left kidney, partial nephrectomy:   -Clear cell renal cell carcinoma, grade 2   -See synoptic checklist below for additional information    REMARKS: The above positive (malignant) diagnosis was called to Rufina in Dr. Victor's office at 09:17 EDT on 7/19/2023 by s.           Synoptic Checklist       KIDNEY: Nephrectomy   KIDNEY: NEPHRECTOMY, PARTIAL OR RADICAL - All Specimens   8th Edition - Protocol posted: 6/30/2021      SPECIMEN      Procedure:    Partial nephrectomy       Specimen Laterality:    Left       TUMOR      Tumor Focality:    Unifocal       Tumor Size:    Greatest Dimension (Centimeters): 1.8 cm      Histologic Type:    Clear cell renal cell carcinoma       Histologic Grade (WHO / ISUP):    G2 (nucleoli conspicuous and eosinophilic at 400x magnification, visible but not prominent at 100x magnification)       Tumor Extent:    Limited to kidney       Sarcomatoid  "Features:    Not identified       Rhabdoid Features:    Not identified       Tumor Necrosis:    Not identified       MARGINS      Margin Status:    All margins negative for invasive carcinoma       REGIONAL LYMPH NODES      Regional Lymph Node Status:    Not applicable (no regional lymph nodes submitted or found)       PATHOLOGIC STAGE CLASSIFICATION (pTNM, AJCC 8th Edition)      Reporting of pT, pN, and (when applicable) pM categories is based on information available to the pathologist at the time the report is issued. As per the AJCC (Chapter 1, 8th Ed.) it is the managing physician’s responsibility to establish the final pathologic stage based upon all pertinent information, including but potentially not limited to this pathology report.      Primary Tumor (pT):    pT1a       Regional Lymph Nodes (pN):    pN not assigned (no nodes submitted or found)       ADDITIONAL FINDINGS      Additional Findings in Nonneoplastic Kidney:    None identified       Gross Description       1. Kidney, Left.  Received in formalin and labeled \"left renal mass\" is a 14 g, 4.5 x 2.5 x 2.5 cm left partial nephrectomy specimen received with 2.0 cm of attached perinephric adipose tissue.  The parenchymal margin is inked blue, and sectioning reveals a 1.8 x 1.7 x 1.5 cm bright orange, hemorrhagic, cystic mass abutting the kidney capsule and located 0.4 cm from the parenchymal margin.  The remaining parenchyma is tan and rubbery.  Representative sections are submitted in 4 cassettes. STEPHANIE        Microscopic Description       Microscopic examination performed.                Assessment and Plan    Diagnoses and all orders for this visit:    1. Renal cancer, left (Primary)  Overview:  S/p Left robotic partial nephrectomy on 7/18/23      She will follow-up in 3 to 4 weeks.  Return to work in a week or 2 with light duty and in 1 month with regular duty.        Follow Up       No follow-ups on file.  Patient was given instructions and " counseling regarding her condition or for health maintenance advice. Please see specific information pulled into the AVS if appropriate.

## 2023-08-14 ENCOUNTER — OFFICE VISIT (OUTPATIENT)
Dept: UROLOGY | Facility: CLINIC | Age: 58
End: 2023-08-14
Payer: COMMERCIAL

## 2023-08-14 VITALS — BODY MASS INDEX: 48.55 KG/M2 | WEIGHT: 274 LBS | HEIGHT: 63 IN

## 2023-08-14 DIAGNOSIS — C64.2 RENAL CANCER, LEFT: Primary | ICD-10-CM

## 2023-08-14 LAB
BILIRUB BLD-MCNC: NEGATIVE MG/DL
CLARITY, POC: CLEAR
COLOR UR: YELLOW
EXPIRATION DATE: ABNORMAL
GLUCOSE UR STRIP-MCNC: NEGATIVE MG/DL
KETONES UR QL: NEGATIVE
LEUKOCYTE EST, POC: ABNORMAL
Lab: ABNORMAL
NITRITE UR-MCNC: NEGATIVE MG/ML
PH UR: 5.5 [PH] (ref 5–8)
PROT UR STRIP-MCNC: ABNORMAL MG/DL
RBC # UR STRIP: ABNORMAL /UL
SP GR UR: 1.02 (ref 1–1.03)
UROBILINOGEN UR QL: ABNORMAL

## 2023-08-14 PROCEDURE — 99024 POSTOP FOLLOW-UP VISIT: CPT | Performed by: UROLOGY

## 2023-08-14 RX ORDER — FLUTICASONE FUROATE, UMECLIDINIUM BROMIDE AND VILANTEROL TRIFENATATE 200; 62.5; 25 UG/1; UG/1; UG/1
POWDER RESPIRATORY (INHALATION)
COMMUNITY
Start: 2023-08-02

## 2023-08-14 NOTE — PROGRESS NOTES
"Chief Complaint  Renal cancer, left    Subjective          Ann Marie Bunch presents to [facility] UROLOGY    History of Present Illness  No complaints.  She is healing well.  She is 1 month out from surgery.  Incisions are well-healed.    Objective   Vital Signs:   Ht 160 cm (63\")   Wt 124 kg (274 lb)   BMI 48.54 kg/mý       Physical Exam  Vitals and nursing note reviewed.   Constitutional:       Appearance: Normal appearance. She is well-developed.   Pulmonary:      Effort: Pulmonary effort is normal.      Breath sounds: Normal air entry.   Neurological:      Mental Status: She is alert and oriented to person, place, and time.      Motor: Motor function is intact.   Psychiatric:         Mood and Affect: Mood normal.         Behavior: Behavior normal.        Result Review :   The following data was reviewed by: Magali Victor MD on 08/14/2023:    Results for orders placed or performed in visit on 08/14/23   POC Urinalysis Dipstick, Automated    Specimen: Urine   Result Value Ref Range    Color Yellow Yellow, Straw, Dark Yellow, Maryse    Clarity, UA Clear Clear    Specific Gravity  1.025 1.005 - 1.030    pH, Urine 5.5 5.0 - 8.0    Leukocytes Small (1+) (A) Negative    Nitrite, UA Negative Negative    Protein, POC 30 mg/dL (A) Negative mg/dL    Glucose, UA Negative Negative mg/dL    Ketones, UA Negative Negative    Urobilinogen, UA 0.2 E.U./dL Normal, 0.2 E.U./dL    Bilirubin Negative Negative    Blood, UA Large (A) Negative    Lot Number [id number]     Expiration Date 72,024             Assessment and Plan    Diagnoses and all orders for this visit:    1. Renal cancer, left (Primary)  Overview:  S/p Left robotic partial nephrectomy on 7/18/23    Orders:  -     POC Urinalysis Dipstick, Automated  -     CT Abdomen Pelvis With & Without Contrast; Future    Follow-up in 5 months with CT scan prior.  We will see her at that time.          Follow Up       Return in about 5 months (around 1/14/2024) for " CT scan prior.  Patient was given instructions and counseling regarding her condition or for health maintenance advice. Please see specific information pulled into the AVS if appropriate.

## 2023-09-08 ENCOUNTER — LAB (OUTPATIENT)
Dept: LAB | Facility: HOSPITAL | Age: 58
End: 2023-09-08
Payer: COMMERCIAL

## 2023-09-08 ENCOUNTER — HOSPITAL ENCOUNTER (OUTPATIENT)
Dept: CARDIOLOGY | Facility: HOSPITAL | Age: 58
Discharge: HOME | End: 2023-09-08
Payer: COMMERCIAL

## 2023-09-08 ENCOUNTER — HOSPITAL ENCOUNTER (OUTPATIENT)
Dept: GENERAL RADIOLOGY | Facility: HOSPITAL | Age: 58
Discharge: HOME | End: 2023-09-08
Payer: COMMERCIAL

## 2023-09-08 ENCOUNTER — TRANSCRIBE ORDERS (OUTPATIENT)
Dept: ADMINISTRATIVE | Facility: HOSPITAL | Age: 58
End: 2023-09-08
Payer: COMMERCIAL

## 2023-09-08 DIAGNOSIS — M25.572 PAIN IN LEFT ANKLE AND JOINTS OF LEFT FOOT: ICD-10-CM

## 2023-09-08 DIAGNOSIS — R07.9 CHEST PAIN, UNSPECIFIED TYPE: Primary | ICD-10-CM

## 2023-09-08 DIAGNOSIS — R07.9 CHEST PAIN, UNSPECIFIED TYPE: ICD-10-CM

## 2023-09-08 LAB
25(OH)D3 SERPL-MCNC: 26.2 NG/ML (ref 30–100)
BASOPHILS # BLD AUTO: 0.04 10*3/MM3 (ref 0–0.2)
BASOPHILS NFR BLD AUTO: 0.4 % (ref 0–1.5)
DEPRECATED RDW RBC AUTO: 42.2 FL (ref 37–54)
EOSINOPHIL # BLD AUTO: 0.15 10*3/MM3 (ref 0–0.4)
EOSINOPHIL NFR BLD AUTO: 1.7 % (ref 0.3–6.2)
ERYTHROCYTE [DISTWIDTH] IN BLOOD BY AUTOMATED COUNT: 12.8 % (ref 12.3–15.4)
HCT VFR BLD AUTO: 39.5 % (ref 34–46.6)
HGB BLD-MCNC: 12.9 G/DL (ref 12–15.9)
IMM GRANULOCYTES # BLD AUTO: 0.03 10*3/MM3 (ref 0–0.05)
IMM GRANULOCYTES NFR BLD AUTO: 0.3 % (ref 0–0.5)
LYMPHOCYTES # BLD AUTO: 3.51 10*3/MM3 (ref 0.7–3.1)
LYMPHOCYTES NFR BLD AUTO: 39.3 % (ref 19.6–45.3)
MCH RBC QN AUTO: 29.5 PG (ref 26.6–33)
MCHC RBC AUTO-ENTMCNC: 32.7 G/DL (ref 31.5–35.7)
MCV RBC AUTO: 90.2 FL (ref 79–97)
MONOCYTES # BLD AUTO: 0.65 10*3/MM3 (ref 0.1–0.9)
MONOCYTES NFR BLD AUTO: 7.3 % (ref 5–12)
NEUTROPHILS NFR BLD AUTO: 4.55 10*3/MM3 (ref 1.7–7)
NEUTROPHILS NFR BLD AUTO: 51 % (ref 42.7–76)
NRBC BLD AUTO-RTO: 0.1 /100 WBC (ref 0–0.2)
PLATELET # BLD AUTO: 299 10*3/MM3 (ref 140–450)
PMV BLD AUTO: 11.4 FL (ref 6–12)
RBC # BLD AUTO: 4.38 10*6/MM3 (ref 3.77–5.28)
WBC NRBC COR # BLD: 8.93 10*3/MM3 (ref 3.4–10.8)

## 2023-09-08 PROCEDURE — 36415 COLL VENOUS BLD VENIPUNCTURE: CPT

## 2023-09-08 PROCEDURE — 71046 X-RAY EXAM CHEST 2 VIEWS: CPT

## 2023-09-08 PROCEDURE — 82306 VITAMIN D 25 HYDROXY: CPT

## 2023-09-08 PROCEDURE — 85025 COMPLETE CBC W/AUTO DIFF WBC: CPT

## 2023-09-08 PROCEDURE — 93005 ELECTROCARDIOGRAM TRACING: CPT | Performed by: PODIATRIST

## 2023-09-10 LAB
QT INTERVAL: 449 MS
QTC INTERVAL: 459 MS

## 2023-09-22 ENCOUNTER — HOSPITAL ENCOUNTER (EMERGENCY)
Facility: HOSPITAL | Age: 58
Discharge: HOME | End: 2023-09-22
Attending: EMERGENCY MEDICINE
Payer: COMMERCIAL

## 2023-09-22 VITALS
TEMPERATURE: 98.4 F | WEIGHT: 265.74 LBS | OXYGEN SATURATION: 95 % | BODY MASS INDEX: 47.09 KG/M2 | RESPIRATION RATE: 18 BRPM | HEART RATE: 91 BPM | HEIGHT: 63 IN

## 2023-09-22 DIAGNOSIS — G89.18 POST-OP PAIN: Primary | ICD-10-CM

## 2023-09-22 PROCEDURE — 99283 EMERGENCY DEPT VISIT LOW MDM: CPT

## 2023-09-22 PROCEDURE — 63710000001 ONDANSETRON ODT 4 MG TABLET DISPERSIBLE: Performed by: NURSE PRACTITIONER

## 2023-09-22 RX ORDER — HYDROCODONE BITARTRATE AND ACETAMINOPHEN 7.5; 325 MG/1; MG/1
1 TABLET ORAL ONCE
Status: COMPLETED | OUTPATIENT
Start: 2023-09-22 | End: 2023-09-22

## 2023-09-22 RX ORDER — ONDANSETRON 4 MG/1
4 TABLET, ORALLY DISINTEGRATING ORAL ONCE
Status: COMPLETED | OUTPATIENT
Start: 2023-09-22 | End: 2023-09-22

## 2023-09-22 RX ADMIN — HYDROCODONE BITARTRATE AND ACETAMINOPHEN 1 TABLET: 7.5; 325 TABLET ORAL at 19:10

## 2023-09-22 RX ADMIN — ONDANSETRON 4 MG: 4 TABLET, ORALLY DISINTEGRATING ORAL at 19:11

## 2023-09-22 NOTE — ED PROVIDER NOTES
"Time: 5:36 PM EDT  Date of encounter:  9/22/2023  Independent Historian/Clinical History and Information was obtained by:   Patient    History is limited by: N/A    Chief Complaint   Patient presents with    Post-op Problem     Bleeding from surgical site.         History of Present Illness:  Patient is a 58 y.o. year old female who presents to the emergency department for evaluation of pain and bleeding from surgical site of left foot.  Started about an hour PTA.  Patient is not on anticoagulants. She had surgery earlier today and  after getting home between 4-5pm, she noticed some bleeding coming through the outer ACE bandage. They called the podiatry office and were advised to come to the ER.    HPI    Patient Care Team  Primary Care Provider: Heike Buchanan APRN    Past Medical History:     Allergies   Allergen Reactions    Motrin [Ibuprofen] Nausea And Vomiting and Swelling    Nsaids Swelling and GI Intolerance    Aspirin Nausea Only and Swelling     STATES \"TEARS HER STOMACH UP\" EVEN WITH FOOD    Adhesive Tape Other (See Comments)     Redness and bruising     Past Medical History:   Diagnosis Date    Asthma     Chronic obstructive pulmonary disease     Cyst of kidney, acquired     bilateral    Diabetes     diet controlled    Hypertension     monitoring, no meds    Left kidney mass     Limb swelling     Low back pain     ddd    Seasonal allergies     allergy shots 2x weekly     Past Surgical History:   Procedure Laterality Date    BACK SURGERY      discectomy lumbar spine x2    ENDOMETRIAL ABLATION      HAMMER TOE REPAIR Bilateral     KNEE SURGERY Right     knee arthroscopy    LAPAROSCOPIC TUBAL LIGATION      NEPHRECTOMY PARTIAL Left 7/18/2023    Procedure: NEPHRECTOMY PARTIAL LAPAROSCOPIC WITH DAVINCI ROBOT;  Surgeon: Magali Victor MD;  Location: [facility];  Service: Robotics - DaVinci;  Laterality: Left;    PLANTAR FASCIA SURGERY Bilateral     SHOULDER ARTHROSCOPY Right     TONSILLECTOMY   "     Family History   Problem Relation Age of Onset    Arthritis Mother         family history of certain chronic disabling diseases     Cancer Father         unspecified    Diabetes Father         unspecified type    Cancer Sister         unspecified    Diabetes Sister         unspecified type    Malig Hyperthermia Neg Hx        Home Medications:  Prior to Admission medications    Medication Sig Start Date End Date Taking? Authorizing Provider   acetaminophen (TYLENOL) 650 MG 8 hr tablet Take 2 tablets by mouth Every 8 (Eight) Hours As Needed for Mild Pain.    Kavon Villasenor MD   albuterol (PROVENTIL) (5 MG/ML) 0.5% nebulizer solution Take 0.3 mL by nebulization Every 6 (Six) Hours As Needed for Wheezing. SOLUTION IS 1.25 MG PER 3 ML    Kavon Villasenor MD   albuterol sulfate  (90 Base) MCG/ACT inhaler Inhale 2 puffs Every 4 (Four) Hours As Needed.    Kavon Villasenor MD   baclofen (LIORESAL) 10 MG tablet Take 1 tablet by mouth 3 (Three) Times a Day As Needed. 5/10/21   Kavon Villasenor MD   benzonatate (TESSALON) 200 MG capsule Take 1 capsule by mouth 2 (Two) Times a Day As Needed. 12/11/20   Kavon Villasenor MD   loratadine (CLARITIN) 10 MG tablet Take 1 tablet by mouth Every Night.    Kavon Villasenor MD   methscopolamine (PAMINE) 2.5 MG tablet Take 1 tablet by mouth Every Night. 12/11/20   Kavon Villasenor MD   montelukast (SINGULAIR) 10 MG tablet Take 1 tablet by mouth Every Night. 9/8/22   Kavon Villasenor MD   pseudoephedrine (SUDAFED) 120 MG 12 hr tablet Take 1 tablet by mouth Every Night. 6/24/21   Kavon Villasenor MD   sennosides-docusate (PERICOLACE) 8.6-50 MG per tablet Take 2 tablets by mouth 2 (Two) Times a Day. 7/19/23   Magali Victor MD Trelegy Ellipta 200-62.5-25 MCG/ACT aerosol powder   8/2/23   Kavon Villasenor MD        Social History:   Social History     Tobacco Use    Smoking status: Former    Smokeless tobacco: Never    Tobacco  "comments:     10/20/2017   Vaping Use    Vaping Use: Never used   Substance Use Topics    Alcohol use: Yes     Comment: occasionally drinks     Drug use: Never         Review of Systems:  Review of Systems   Constitutional: Negative.    HENT: Negative.     Eyes: Negative.    Respiratory: Negative.     Cardiovascular: Negative.    Gastrointestinal: Negative.    Endocrine: Negative.    Genitourinary: Negative.    Musculoskeletal: Negative.  Negative for neck pain.   Skin:  Positive for wound.   Allergic/Immunologic: Negative.    Neurological: Negative.    Hematological: Negative.    Psychiatric/Behavioral: Negative.        Physical Exam:  Pulse 91   Temp 98.4 °F (36.9 °C) (Oral)   Resp 18   Ht 160 cm (63\")   Wt 121 kg (265 lb 11.9 oz)   SpO2 95%   BMI 47.07 kg/m²         Physical Exam  Constitutional:       Appearance: Normal appearance.   HENT:      Head: Normocephalic and atraumatic.      Nose: Nose normal.      Mouth/Throat:      Mouth: Mucous membranes are moist.   Eyes:      Pupils: Pupils are equal, round, and reactive to light.   Cardiovascular:      Rate and Rhythm: Normal rate and regular rhythm.      Pulses: Normal pulses.   Pulmonary:      Effort: Pulmonary effort is normal.      Breath sounds: Normal breath sounds.   Abdominal:      General: Abdomen is flat. Bowel sounds are normal. There is no distension.      Palpations: Abdomen is soft.   Musculoskeletal:         General: Normal range of motion.      Cervical back: Normal range of motion and neck supple.      Right foot: Normal.      Comments: Bulky post op dressing noted to left lower leg/foot with some bleeding noted to be coming through to the lateral heel and lateral foot area.    Skin:     General: Skin is warm and dry.      Capillary Refill: Capillary refill takes less than 2 seconds.   Neurological:      General: No focal deficit present.      Mental Status: She is alert and oriented to person, place, and time. Mental status is at baseline. "   Psychiatric:         Mood and Affect: Mood normal.         Behavior: Behavior normal.                    Procedures:  Procedures      Medical Decision Making:      Comorbidities that affect care:    Diabetes, Hypertension, Obesity    External Notes reviewed:    None      The following orders were placed and all results were independently analyzed by me:  No orders of the defined types were placed in this encounter.      Medications Given in the Emergency Department:  Medications   HYDROcodone-acetaminophen (NORCO) 7.5-325 MG per tablet 1 tablet (1 tablet Oral Given 9/22/23 1910)   ondansetron ODT (ZOFRAN-ODT) disintegrating tablet 4 mg (4 mg Oral Given 9/22/23 1911)        ED Course:    The patient was initially evaluated in the triage area where orders were placed. The patient was later dispositioned by GINA Aviles.      The patient was advised to stay for completion of workup which includes but is not limited to communication of labs and radiological results, reassessment and plan. The patient was advised that leaving prior to disposition by a provider could result in critical findings that are not communicated to the patient.     ED Course as of 09/22/23 1940   Fri Sep 22, 2023   1738 --- PROVIDER IN TRIAGE NOTE ---    The patient was evaluated by Ronna ortega in triage. Orders were placed and the patient is currently awaiting disposition.    [AJ]   3404 Contacted Dr Mireles (podiatrist) who advises remove ACE and reinforce dressing, instruct pt to elevate and follow all post op directions as given and keep follow up appt as scheduled. [TP]      ED Course User Index  [AJ] Ronna Johnson PA-C  [TP] Kavitha Stevens APRN       Labs:    Lab Results (last 24 hours)       ** No results found for the last 24 hours. **             Imaging:    No Radiology Exams Resulted Within Past 24 Hours      Differential Diagnosis and Discussion:               [facility]           Patient Care Considerations:            Consultants/Shared Management Plan:    I have discussed the case with Dr Mireles who states that the patient can be safely discharged with close follow up.    Social Determinants of Health:    Patient is independent, reliable, and has access to care.       Disposition and Care Coordination:    Discharged: The patient is suitable and stable for discharge with no need for consideration of observation or admission.    I have explained the patient´s condition, diagnoses and treatment plan based on the information available to me at this time. I have answered questions and addressed any concerns. The patient has a good  understanding of the patient´s diagnosis, condition, and treatment plan as can be expected at this point. The vital signs have been stable. The patient´s condition is stable and appropriate for discharge from the emergency department.      The patient will pursue further outpatient evaluation with the primary care physician or other designated or consulting physician as outlined in the discharge instructions. They are agreeable to this plan of care and follow-up instructions have been explained in detail. The patient has received these instructions in written format and have expressed an understanding of the discharge instructions. The patient is aware that any significant change in condition or worsening of symptoms should prompt an immediate return to this or the closest emergency department or call to 911.  I have explained discharge medications and the need for follow up with the patient/caretakers. This was also printed in the discharge instructions. Patient was discharged with the following medications and follow up:      Medication List      No changes were made to your prescriptions during this visit.      Salinas Freeman DPM  [address] [address]  [phone]             Final diagnoses:   Post-op pain        ED Disposition       ED Disposition   Discharge     Condition   Stable    Comment   --               This medical record created using voice recognition software.             Kavitha Stevens, APRN  09/22/23 1940

## 2023-09-22 NOTE — DISCHARGE INSTRUCTIONS
Elevate as much as possible  Keep your follow up as scheduled  Return or go to the nearest ER for worsening or new symptoms of concern

## 2023-10-02 ENCOUNTER — TELEPHONE (OUTPATIENT)
Dept: ORTHOPEDIC SURGERY | Facility: CLINIC | Age: 58
End: 2023-10-02

## 2023-10-02 NOTE — TELEPHONE ENCOUNTER
Caller: Ann Marie Bunch    Relationship to patient: Self    Best call back number:     Chief complaint: RT KNEE    Type of visit: FUP SYNVISC/CORTISONE INJECTION     Requested date: EARLY MORNING     If rescheduling, when is the original appointment: 10/2/23     Additional notes:PATIENT HAD A SYNVISC KNEE INJECTION ON 5/15/23.  SHE COULDN'T REMEMBER IF TODAY WOULD BE ANOTHER SYNVISC OR A CORTISONE INJECTION SO I AM SENDING OVER JUST IN CASE.  PLEASE CALL TO RESCHEDULE

## 2023-11-22 ENCOUNTER — OFFICE VISIT (OUTPATIENT)
Dept: ORTHOPEDIC SURGERY | Facility: CLINIC | Age: 58
End: 2023-11-22
Payer: COMMERCIAL

## 2023-11-22 VITALS — BODY MASS INDEX: 46.95 KG/M2 | WEIGHT: 265 LBS | HEIGHT: 63 IN

## 2023-11-22 DIAGNOSIS — M17.11 PRIMARY OSTEOARTHRITIS OF RIGHT KNEE: Primary | ICD-10-CM

## 2023-11-22 RX ORDER — LIDOCAINE HYDROCHLORIDE 10 MG/ML
5 INJECTION, SOLUTION EPIDURAL; INFILTRATION; INTRACAUDAL; PERINEURAL
Status: COMPLETED | OUTPATIENT
Start: 2023-11-22 | End: 2023-11-22

## 2023-11-22 RX ORDER — TRIAMCINOLONE ACETONIDE 40 MG/ML
40 INJECTION, SUSPENSION INTRA-ARTICULAR; INTRAMUSCULAR
Status: COMPLETED | OUTPATIENT
Start: 2023-11-22 | End: 2023-11-22

## 2023-11-22 RX ADMIN — LIDOCAINE HYDROCHLORIDE 5 ML: 10 INJECTION, SOLUTION EPIDURAL; INFILTRATION; INTRACAUDAL; PERINEURAL at 10:54

## 2023-11-22 RX ADMIN — TRIAMCINOLONE ACETONIDE 40 MG: 40 INJECTION, SUSPENSION INTRA-ARTICULAR; INTRAMUSCULAR at 10:54

## 2023-11-22 NOTE — PROGRESS NOTES
"Chief Complaint  Pain and Follow-up of the Right Knee    Subjective      Ann Marie Bunch presents to [facility] for follow-up of right knee pain and osteoarthritis.  She has previous history of right knee arthroscopy with partial medial meniscectomy, chondroplasty, and removal of loose bodies performed on 3/11/2021.  Patient has managed this conservatively with intermittent injections in the past.  She was last seen in office on 6/26/2023 and received a right knee steroid injection.  Prior to that, patient had received right knee Synvisc injection in office on 5/15/2023.  She reports significant relief with Synvisc injection and would like to repeat this in the future.  However, today she presents independently ambulatory without use of assistive device and states that her knee is \"not so great\".  Reports that she had recent left foot surgery and was on knee scooter for prolonged period of time, which caused increased pain.  She would like to proceed with repeat right knee steroid injection in office today.  Patient has a history of diabetes mellitus type 2 with dietary control, reporting last A1c at 5.2.    Objective   Allergies   Allergen Reactions    Motrin [Ibuprofen] Nausea And Vomiting and Swelling    Nsaids Swelling and GI Intolerance    Aspirin Nausea Only and Swelling     STATES \"TEARS HER STOMACH UP\" EVEN WITH FOOD    Adhesive Tape Other (See Comments)     Redness and bruising       Vital Signs:   Ht 160 cm (63\")   Wt 120 kg (265 lb)   BMI 46.94 kg/m²       Physical Exam    Constitutional: Awake, alert. Well nourished appearance.    Integumentary: Warm, dry, intact. No obvious rashes.    HENT: Atraumatic, normocephalic.   Respiratory: Non labored respirations .   Cardiovascular: Intact peripheral pulses.    Psychiatric: Normal mood and affect. A&O X3    Ortho Exam  Right knee: Skin is warm, dry, and intact.  Old, well-healed surgical scars noted.  Mild edema.  Patella is " well tracking and knee is stable to varus and valgus stress.  Full knee extension and flexion to 120 degrees.  Full plantarflexion and dorsiflexion of the ankle.  Sensation intact light touch.  Distal neurovascular intact.  Smooth sit to stand transition.  Patient fully weightbearing with nonantalgic gait.    Imaging Results (Most Recent)       None             Large Joint Arthrocentesis: R knee  Date/Time: 11/22/2023 10:54 AM  Consent given by: patient  Site marked: site marked  Timeout: Immediately prior to procedure a time out was called to verify the correct patient, procedure, equipment, support staff and site/side marked as required   Supporting Documentation  Indications: pain   Procedure Details  Location: knee - R knee  Needle gauge: 21G.  Medications administered: 5 mL lidocaine PF 1% 1 %; 40 mg triamcinolone acetonide 40 MG/ML  Patient tolerance: patient tolerated the procedure well with no immediate complications              Assessment and Plan   Problem List Items Addressed This Visit          Musculoskeletal and Injuries    Primary osteoarthritis of right knee - Primary       Follow Up   Return for Recheck.    Tobacco Use: Medium Risk (11/22/2023)    Patient History     Smoking Tobacco Use: Former     Smokeless Tobacco Use: Never     Passive Exposure: Not on file     Patient is a former smoker.  Encouraged continued tobacco cessation.  Did not discuss options for smoking cessation.    Patient Instructions   Right knee steroid injection administered in office today.  Advised on 3 months duration between injections.  Patient is diabetic and was advised on need for close blood glucose monitoring over the course of the next 24 to 48 hours.  We will seek insurance authorization for viscosupplementation.  Follow-up with insurance approval.  Patient was given instructions and counseling regarding her condition or for health maintenance advice. Please see specific information pulled into the AVS if  appropriate.

## 2023-11-22 NOTE — PATIENT INSTRUCTIONS
Right knee steroid injection administered in office today.  Advised on 3 months duration between injections.  Patient is diabetic and was advised on need for close blood glucose monitoring over the course of the next 24 to 48 hours.  We will seek insurance authorization for viscosupplementation.  Follow-up with insurance approval.

## 2024-01-03 ENCOUNTER — TELEPHONE (OUTPATIENT)
Dept: UROLOGY | Facility: CLINIC | Age: 59
End: 2024-01-03
Payer: COMMERCIAL

## 2024-01-03 NOTE — TELEPHONE ENCOUNTER
PATIENT CALLED SCHEDULING AND THEY TRANSFERRED HER HERE.    PATIENT SAID DR. WHITE ORDERED A CT SCAN FOR HER.  SHE SAID INSURANCE WILL NOT COVER IT ALL THROUGH [facility] [place], operated by [facility].     SHE SAID SHE CALLS [facility] AND THEN THEY CALL ANOTHER PLACE AND SCHEDULE THIS FOR HER.  SHE NEEDS TO SPEAK TO SOMEONE REGARDING THIS.      SOMEONE HERE GAVE HER [phone] NUMBER TO CALL AND 2 OTHER NUMBERS TO CALL.

## 2024-01-03 NOTE — TELEPHONE ENCOUNTER
Spoke with patient and gave the number that I Googled for US [phone] so patient can call to get CT scheduled. Patient stated would contact us back with appointment information.

## 2024-01-03 NOTE — TELEPHONE ENCOUNTER
XANDER CALLED FROM [facility].  SHE REQUESTED THAT A SIGNED ORDER FOR IMAGING BE FAXED [phone], SO THEY CAN GET THE PATIENT SCHEDULED.    I FAXED THE ORDER, PER HER REQUEST.

## 2024-01-17 ENCOUNTER — TELEPHONE (OUTPATIENT)
Dept: ORTHOPEDIC SURGERY | Facility: CLINIC | Age: 59
End: 2024-01-17
Payer: COMMERCIAL

## 2024-01-17 NOTE — TELEPHONE ENCOUNTER
APPT: 1-24 AT 8:00 AM RT KNEE SYNVISC ONE INJ WITH KIRAN AT [place].    LAST RT KNEE STEROID INJ:  11-22    ENTERED MALINA HANSENQ

## 2024-01-17 NOTE — TELEPHONE ENCOUNTER
----- Message from Kamla Matamoros sent at 1/9/2024  2:39 PM EST -----  No PA Required for Right Knee Synvisc One.  Okay to schedule when appropriate.

## 2024-01-22 ENCOUNTER — OFFICE VISIT (OUTPATIENT)
Dept: UROLOGY | Facility: CLINIC | Age: 59
End: 2024-01-22
Payer: COMMERCIAL

## 2024-01-22 VITALS
BODY MASS INDEX: 49.47 KG/M2 | HEIGHT: 63 IN | DIASTOLIC BLOOD PRESSURE: 69 MMHG | SYSTOLIC BLOOD PRESSURE: 132 MMHG | WEIGHT: 279.2 LBS

## 2024-01-22 DIAGNOSIS — N32.81 OAB (OVERACTIVE BLADDER): ICD-10-CM

## 2024-01-22 DIAGNOSIS — C64.2 RENAL CANCER, LEFT: Primary | ICD-10-CM

## 2024-01-22 LAB
BILIRUB BLD-MCNC: NEGATIVE MG/DL
CLARITY, POC: CLEAR
COLOR UR: YELLOW
EXPIRATION DATE: NORMAL
GLUCOSE UR STRIP-MCNC: NEGATIVE MG/DL
KETONES UR QL: NEGATIVE
LEUKOCYTE EST, POC: NEGATIVE
Lab: NORMAL
NITRITE UR-MCNC: NEGATIVE MG/ML
PH UR: 5.5 [PH] (ref 5–8)
PROT UR STRIP-MCNC: NEGATIVE MG/DL
RBC # UR STRIP: NEGATIVE /UL
SP GR UR: 1.03 (ref 1–1.03)
UROBILINOGEN UR QL: NORMAL

## 2024-01-22 PROCEDURE — 99214 OFFICE O/P EST MOD 30 MIN: CPT | Performed by: UROLOGY

## 2024-01-22 PROCEDURE — 81003 URINALYSIS AUTO W/O SCOPE: CPT | Performed by: UROLOGY

## 2024-01-22 RX ORDER — OXYBUTYNIN CHLORIDE 5 MG/1
5 TABLET, EXTENDED RELEASE ORAL DAILY
Qty: 30 TABLET | Refills: 11 | Status: SHIPPED | OUTPATIENT
Start: 2024-01-22

## 2024-01-22 NOTE — PROGRESS NOTES
"Chief Complaint  Renal Cancer (5 mo f/u left and concerns of urgency when having to go to the restrooms)    Subjective          Ann Marie Bunch presents to [facility]    History of Present Illness  Ms. Bunch is 6 months out from a laparoscopic partial nephrectomy.  Recent CT scan reveals no evidence of recurrence.  She does have a few renal cysts which are stable or smaller than they had been.  We reviewed her recent CT scan from January 2024.    In addition she is complaining of some overactive bladder with frequency urgency and some mild urge incontinence.  She does have urgency even worse at night.  Patient would like to try medication for this.      Objective   Vital Signs:   /69 (BP Location: Left arm, Patient Position: Sitting, Cuff Size: Adult)   Ht 160 cm (63\")   Wt 127 kg (279 lb 3.2 oz)   BMI 49.46 kg/m²       Physical Exam  Vitals and nursing note reviewed.   Constitutional:       Appearance: Normal appearance. She is well-developed.   Pulmonary:      Effort: Pulmonary effort is normal.      Breath sounds: Normal air entry.   Neurological:      Mental Status: She is alert and oriented to person, place, and time.      Motor: Motor function is intact.   Psychiatric:         Mood and Affect: Mood normal.         Behavior: Behavior normal.          Result Review :   The following data was reviewed by: Magali Victor MD on 01/22/2024:    Results for orders placed or performed in visit on 01/22/24   POC Urinalysis Dipstick, Automated    Specimen: Urine   Result Value Ref Range    Color Yellow Yellow, Straw, Dark Yellow, Maryse    Clarity, UA Clear Clear    Specific Gravity  1.030 1.005 - 1.030    pH, Urine 5.5 5.0 - 8.0    Leukocytes Negative Negative    Nitrite, UA Negative Negative    Protein, POC Negative Negative mg/dL    Glucose, UA Negative Negative mg/dL    Ketones, UA Negative Negative    Urobilinogen, UA Normal Normal, 0.2 E.U./dL    Bilirubin Negative Negative    " Blood, UA Negative Negative    Lot Number [id number]     Expiration Date 123,024           Study Result        I reviewed her recent CT scan from January 2024 at [facility] imaging.  Discussed this with her as well.  No evidence of recurrence of disease.  Her cyst are stable or smaller than they had been.         Assessment and Plan    Diagnoses and all orders for this visit:    1. Renal cancer, left (Primary)  Overview:  S/p Left robotic partial nephrectomy on 7/18/23    Orders:  -     POC Urinalysis Dipstick, Automated  -     CT Abdomen Pelvis With & Without Contrast; Future    2. OAB (overactive bladder)  -     oxybutynin XL (DITROPAN-XL) 5 MG 24 hr tablet; Take 1 tablet by mouth Daily.  Dispense: 30 tablet; Refill: 11    Will repeat her CT scan in 1 year.  I will see her back at that time.  No evidence of recurrence on recent CT scan in January 2024.  In terms of her overactive bladder and started on oxybutynin ER 5 mg once a day.  She will let me know if that is on effective.  Otherwise I will see her back in 1 year.          Follow Up       Return in about 1 year (around 1/22/2025) for CT scan prior.  Patient was given instructions and counseling regarding her condition or for health maintenance advice. Please see specific information pulled into the AVS if appropriate.         Medical Decision Making Visit complexity attestation:      Complexity of Visit:  Moderate  0 acute problems and 2 chronic problems addressed today  I reviewed 1 tests today.  I ordered 1 tests.  I sent in 1 prescription.  Risk: Moderate

## 2024-01-24 ENCOUNTER — OFFICE VISIT (OUTPATIENT)
Dept: ORTHOPEDIC SURGERY | Facility: CLINIC | Age: 59
End: 2024-01-24
Payer: COMMERCIAL

## 2024-01-24 VITALS
BODY MASS INDEX: 44.12 KG/M2 | DIASTOLIC BLOOD PRESSURE: 92 MMHG | HEIGHT: 63 IN | SYSTOLIC BLOOD PRESSURE: 166 MMHG | WEIGHT: 249 LBS | OXYGEN SATURATION: 95 % | HEART RATE: 78 BPM

## 2024-01-24 DIAGNOSIS — E66.01 OBESITY, MORBID, BMI 40.0-49.9: ICD-10-CM

## 2024-01-24 DIAGNOSIS — M17.11 PRIMARY OSTEOARTHRITIS OF RIGHT KNEE: Primary | ICD-10-CM

## 2024-01-24 NOTE — PROGRESS NOTES
"Chief Complaint  Follow-up of the Right Knee    Subjective      Ann Marie Bunch presents to [facility] for follow-up of right knee pain and osteoarthritis.  She has a history of right knee arthroscopy with partial medial meniscectomy, chondroplasty, and removal of loose bodies performed on 3/11/2021 by Dr. Clark.  She has had continued right knee pain, which she has managed conservatively with intermittent injections in the past.  She previously received right knee Synvisc injection in office on 5/15/2023 and most recently a right knee steroid injection in office on 11/22/2023.  She presents independently ambulatory without use of assistive device.  She presents with insurance authorization for right knee Synvisc injection, requesting to proceed with this in office today.  Reports that she is considering surgery in the future, however, would like to put this off for as long as possible.    Objective   Allergies   Allergen Reactions    Motrin [Ibuprofen] Nausea And Vomiting and Swelling    Nsaids Swelling and GI Intolerance    Aspirin Nausea Only and Swelling     STATES \"TEARS HER STOMACH UP\" EVEN WITH FOOD    Adhesive Tape Other (See Comments)     Redness and bruising       Vital Signs:   /92   Pulse 78   Ht 160 cm (63\")   Wt 113 kg (249 lb)   SpO2 95%   BMI 44.11 kg/m²       Physical Exam    Constitutional: Awake, alert. Well nourished appearance.    Integumentary: Warm, dry, intact. No obvious rashes.    HENT: Atraumatic, normocephalic.   Respiratory: Non labored respirations .   Cardiovascular: Intact peripheral pulses.    Psychiatric: Normal mood and affect. A&O X3    Ortho Exam  Right knee: Skin is warm, dry, and intact.  Tenderness to palpation of lateral joint line.  Patella is well tracking and knee is stable to varus and valgus stress.  Full knee extension and flexion to 120 degrees.  Full plantarflexion and dorsiflexion of the ankle.  Sensation intact light touch.  " Distal neurovascular intact.  There is crepitus with ROM.  Patient is fully weightbearing.    Imaging Results (Most Recent)       Procedure Component Value Units Date/Time    XR Knee 3 View Right [[id number]] Resulted: 01/24/24 0838     Updated: 01/24/24 0838    Narrative:      X-Ray Report:  Study: X-rays ordered, taken in the office, and reviewed today.   Site: Right knee Xray  Indication: Pain  View: AP/Lateral, Standing, and [place] view(s)  Findings: Advanced degenerative changes to the right knee with significant   joint space narrowing noted in the medial compartment.  Extensive   osteophyte formation present.  Prior studies available for comparison: yes              Large Joint Arthrocentesis: R knee  Date/Time: 1/24/2024 8:17 AM  Consent given by: patient  Site marked: site marked  Timeout: Immediately prior to procedure a time out was called to verify the correct patient, procedure, equipment, support staff and site/side marked as required   Supporting Documentation  Indications: pain   Procedure Details  Location: knee - R knee  Preparation: Patient was prepped and draped in the usual sterile fashion  Needle gauge: 21 G.  Approach: lateral  Medications administered: 48 mg hylan 48 MG/6ML  Patient tolerance: patient tolerated the procedure well with no immediate complications              Assessment and Plan   Problem List Items Addressed This Visit          Musculoskeletal and Injuries    Primary osteoarthritis of right knee - Primary    Relevant Orders    XR Knee 3 View Right (Completed)     Other Visit Diagnoses       Obesity, morbid, BMI 40.0-49.9                Follow Up   Return if symptoms worsen or fail to improve.    Tobacco Use: Medium Risk (1/24/2024)    Patient History     Smoking Tobacco Use: Former     Smokeless Tobacco Use: Never     Passive Exposure: Not on file       Educated on risk of smoking. Discussed options for smoking cessation.    Patient Instructions   Right knee x-rays taken and  reviewed in office today.  She is a candidate for TKA.  She is aware of need to maintain BMI below 45 for consideration for TKA. Encouraged continued weight loss efforts.  Patient admits to significant Mountain Dew consumption. Stongly encourage discontinuation or significant reduction and consideration for nutrition referral.     Discussed continued other options, including referral to physical therapy, participation in home exercise program, trial of NSAIDs, continued steroid injections, and viscosupplementation.  She elects to proceed with right knee Synvisc injection administered in office today.  Advised on 6 months duration between injections.  Follow-up as needed.  Call with changes or concerns.      Patient was given instructions and counseling regarding her condition or for health maintenance advice. Please see specific information pulled into the AVS if appropriate.

## 2024-01-31 ENCOUNTER — OFFICE VISIT (OUTPATIENT)
Dept: PULMONOLOGY | Facility: CLINIC | Age: 59
End: 2024-01-31
Payer: COMMERCIAL

## 2024-01-31 VITALS
DIASTOLIC BLOOD PRESSURE: 73 MMHG | HEIGHT: 63 IN | HEART RATE: 72 BPM | OXYGEN SATURATION: 100 % | SYSTOLIC BLOOD PRESSURE: 155 MMHG | BODY MASS INDEX: 49.5 KG/M2 | WEIGHT: 279.4 LBS | TEMPERATURE: 97.5 F | RESPIRATION RATE: 18 BRPM

## 2024-01-31 DIAGNOSIS — E66.01 MORBID (SEVERE) OBESITY DUE TO EXCESS CALORIES: ICD-10-CM

## 2024-01-31 DIAGNOSIS — J45.40 MODERATE PERSISTENT ASTHMA, UNSPECIFIED WHETHER COMPLICATED: ICD-10-CM

## 2024-01-31 DIAGNOSIS — J41.8 MIXED SIMPLE AND MUCOPURULENT CHRONIC BRONCHITIS: Primary | ICD-10-CM

## 2024-01-31 RX ORDER — AZITHROMYCIN 250 MG/1
TABLET, FILM COATED ORAL
Qty: 6 TABLET | Refills: 0 | Status: SHIPPED | OUTPATIENT
Start: 2024-01-31

## 2024-01-31 RX ORDER — FLUTICASONE PROPIONATE AND SALMETEROL 250; 50 UG/1; UG/1
1 POWDER RESPIRATORY (INHALATION)
Qty: 60 EACH | Refills: 11 | Status: SHIPPED | OUTPATIENT
Start: 2024-01-31

## 2024-01-31 NOTE — PROGRESS NOTES
Pulmonary Consultation    Heike Woodard AP*,    Thank you for asking me to see Ann Marie Bunch for   Chief Complaint   Patient presents with    [facility] Care    COPD    Asthma    Shortness of Breath   .      History of Present Illness  Ann Marie Bunch is a 59 y.o. female with a PMH significant for COPD bronchial asthma and renal cancer s/p nephrectomy presents to establish [facility] patient has been having some cough with chest congestion and wheezing worse over the past week or so patient denies any significant expectoration patient does not tolerate the Trelegy and wants the alternative  Patient has already quit smoking  Patient also is seeing allergist and is getting allergy shots for her bronchial asthma she also complains of joint pain secondary to her osteoarthrosis and plans to see the rheumatologist  Patient denies any chest pain fever hemoptysis or weight loss there is no history of calf pain or swelling      Tobacco use history:  Former smoker      Review of Systems: History obtained from chart review and the patient.  Review of Systems   Respiratory:  Positive for cough, shortness of breath and wheezing.    All other systems reviewed and are negative.    As described in the HPI. Otherwise, remainder of ROS (14 systems) were negative.    Patient Active Problem List   Diagnosis    Aftercare following right knee arthroscopy with partial medial menisectomy, chondroplasty and removal of loose bodies    Primary osteoarthritis of right knee    Left renal mass    S/p nephrectomy    Renal cancer, left    OAB (overactive bladder)         Current Outpatient Medications:     acetaminophen (TYLENOL) 650 MG 8 hr tablet, Take 2 tablets by mouth Every 8 (Eight) Hours As Needed for Mild Pain., Disp: , Rfl:     albuterol (PROVENTIL) (5 MG/ML) 0.5% nebulizer solution, Take 0.3 mL by nebulization Every 6 (Six) Hours As Needed for Wheezing. SOLUTION IS 1.25 MG PER 3 ML, Disp: , Rfl:     albuterol sulfate  (90  "Base) MCG/ACT inhaler, Inhale 2 puffs Every 4 (Four) Hours As Needed., Disp: , Rfl:     baclofen (LIORESAL) 10 MG tablet, Take 1 tablet by mouth 3 (Three) Times a Day As Needed., Disp: , Rfl:     benzonatate (TESSALON) 200 MG capsule, Take 1 capsule by mouth 2 (Two) Times a Day As Needed., Disp: , Rfl:     loratadine (CLARITIN) 10 MG tablet, Take 1 tablet by mouth Every Night., Disp: , Rfl:     methscopolamine (PAMINE) 2.5 MG tablet, Take 1 tablet by mouth Every Night., Disp: , Rfl:     montelukast (SINGULAIR) 10 MG tablet, Take 1 tablet by mouth Every Night., Disp: , Rfl:     pseudoephedrine (SUDAFED) 120 MG 12 hr tablet, Take 1 tablet by mouth Every Night., Disp: , Rfl:     Fluticasone-Salmeterol (ADVAIR/WIXELA) 250-50 MCG/ACT DISKUS, Inhale 1 puff 2 (Two) Times a Day., Disp: 60 each, Rfl: 11    oxybutynin XL (DITROPAN-XL) 5 MG 24 hr tablet, Take 1 tablet by mouth Daily. (Patient not taking: Reported on 1/31/2024), Disp: 30 tablet, Rfl: 11    sennosides-docusate (PERICOLACE) 8.6-50 MG per tablet, Take 2 tablets by mouth 2 (Two) Times a Day. (Patient not taking: Reported on 1/31/2024), Disp: 30 tablet, Rfl: 1    tiotropium bromide monohydrate (SPIRIVA RESPIMAT) 2.5 MCG/ACT aerosol solution inhaler, Inhale 2 puffs Daily., Disp: 1 each, Rfl: 5    Trelegy Ellipta 200-62.5-25 MCG/ACT aerosol powder , , Disp: , Rfl:     Allergies   Allergen Reactions    Motrin [Ibuprofen] Nausea And Vomiting and Swelling    Nsaids Swelling and GI Intolerance    Aspirin Nausea Only and Swelling     STATES \"TEARS HER STOMACH UP\" EVEN WITH FOOD    Adhesive Tape Other (See Comments)     Redness and bruising       Past Medical History:   Diagnosis Date    Asthma     Chronic obstructive pulmonary disease     Cyst of kidney, acquired     bilateral    Diabetes     diet controlled    Hypertension     monitoring, no meds    Left kidney mass     Limb swelling     Low back pain     ddd    Seasonal allergies     allergy shots 2x weekly     Past " "Surgical History:   Procedure Laterality Date    BACK SURGERY      discectomy lumbar spine x2    ENDOMETRIAL ABLATION      FOOT SURGERY Left 09/15/2023    big toe    HAMMER TOE REPAIR Bilateral     KNEE SURGERY Right     knee arthroscopy    LAPAROSCOPIC TUBAL LIGATION      NEPHRECTOMY PARTIAL Left 07/18/2023    Procedure: NEPHRECTOMY PARTIAL LAPAROSCOPIC WITH DAVINCI ROBOT;  Surgeon: Magali Victor MD;  [facility] OR;  Service: Robotics - DaVinci;  Laterality: Left;    PLANTAR FASCIA SURGERY Bilateral     SHOULDER ARTHROSCOPY Right     TONSILLECTOMY       Social History     Socioeconomic History    Marital status:    Tobacco Use    Smoking status: Former    Smokeless tobacco: Never    Tobacco comments:     10/20/2017   Vaping Use    Vaping Use: Never used   Substance and Sexual Activity    Alcohol use: Yes     Comment: occasionally drinks     Drug use: Never    Sexual activity: Defer     Family History   Problem Relation Age of Onset    Arthritis Mother         family history of certain chronic disabling diseases     Cancer Father         unspecified    Diabetes Father         unspecified type    Cancer Sister         unspecified    Diabetes Sister         unspecified type    Malig Hyperthermia Neg Hx        No radiology results for the last 90 days.        Objective     Blood pressure 155/73, pulse 72, temperature 97.5 °F (36.4 °C), temperature source Tympanic, resp. rate 18, height 160 cm (63\"), weight 127 kg (279 lb 6.4 oz), SpO2 100%, not currently breastfeeding.  Physical Exam  Vitals and nursing note reviewed.   Constitutional:       Appearance: Normal appearance.   HENT:      Head: Normocephalic and atraumatic.      Nose: Nose normal.      Mouth/Throat:      Mouth: Mucous membranes are moist.      Pharynx: Oropharynx is clear.   Eyes:      Extraocular Movements: Extraocular movements intact.      Conjunctiva/sclera: Conjunctivae normal.      Pupils: Pupils are equal, round, and reactive " to light.   Cardiovascular:      Rate and Rhythm: Normal rate and regular rhythm.      Pulses: Normal pulses.      Heart sounds: Normal heart sounds.   Pulmonary:      Effort: Pulmonary effort is normal.      Breath sounds: Wheezing and rhonchi present.   Abdominal:      General: Abdomen is flat. Bowel sounds are normal.      Palpations: Abdomen is soft.   Musculoskeletal:         General: Normal range of motion.      Cervical back: Normal range of motion and neck supple.   Skin:     General: Skin is warm.      Capillary Refill: Capillary refill takes 2 to 3 seconds.   Neurological:      General: No focal deficit present.      Mental Status: She is alert and oriented to person, place, and time.   Psychiatric:         Mood and Affect: Mood normal.         Behavior: Behavior normal.         There is no immunization history on file for this patient.         Assessment & Plan     Diagnoses and all orders for this visit:    1. Mixed simple and mucopurulent chronic bronchitis (Primary)  -     Complete PFT - Pre & Post Bronchodilator; Future    2. Moderate persistent asthma, unspecified whether complicated  -     Complete PFT - Pre & Post Bronchodilator; Future    3. Morbid (severe) obesity due to excess calories  -     Complete PFT - Pre & Post Bronchodilator; Future    Other orders  -     Fluticasone-Salmeterol (ADVAIR/WIXELA) 250-50 MCG/ACT DISKUS; Inhale 1 puff 2 (Two) Times a Day.  Dispense: 60 each; Refill: 11  -     tiotropium bromide monohydrate (SPIRIVA RESPIMAT) 2.5 MCG/ACT aerosol solution inhaler; Inhale 2 puffs Daily.  Dispense: 1 each; Refill: 5         Result Review :       Data reviewed : Radiologic studies chest      Discussion/ Recommendations:   Patient is advised to reduce weight her BMI is 49.49  Will start her on Advair and Spiriva  Advair twice daily and Spiriva once daily  Albuterol inhaler every 6 hours  Singulair daily  We will order PFTs for evaluation  Chest x-ray reviewed from couple of months  ago does not reveal any infiltrates  Discussed vaccination and recommended    Class 3 Severe Obesity (BMI >=40). Obesity-related health conditions include the following: hypertension. Obesity is unchanged. BMI is is above average; BMI management plan is completed. We discussed low calorie, low carb based diet program, portion control, and increasing exercise.           Return in about 3 months (around 4/30/2024).      Thank you for allowing me to participate in the care of Ann Marie Bunch. Please do not hesitate to contact me with any questions.         This document has been electronically signed by Jerson Mata MD on January 31, 2024 09:33 EST

## 2024-02-05 NOTE — TELEPHONE ENCOUNTER
Caller: Ann Marie Bunch    Relationship: Self    Best call back number: [phone]    Requested Prescriptions:   Requested Prescriptions     Pending Prescriptions Disp Refills    tiotropium bromide monohydrate (SPIRIVA RESPIMAT) 2.5 MCG/ACT aerosol solution inhaler 1 each 5     Sig: Inhale 2 puffs Daily.        Pharmacy where request should be sent: [facility] - [facility] - [address] AT [address] TO [facility] [phone]  - [phone]      Last office visit with prescribing clinician: 1/31/2024   Last telemedicine visit with prescribing clinician: Visit date not found   Next office visit with prescribing clinician: 4/30/2024     Additional details provided by patient: INSURANCE COVERS 90 DAY SUPPLY    Does the patient have less than a 3 day supply:  [x] Yes  [] No    Would you like a call back once the refill request has been completed: [x] Yes [] No    If the office needs to give you a call back, can they leave a voicemail: [x] Yes [] No    Vicky Rubin Rep   02/05/24 12:54 EST

## 2024-02-29 ENCOUNTER — HOSPITAL ENCOUNTER (OUTPATIENT)
Dept: RESPIRATORY THERAPY | Facility: HOSPITAL | Age: 59
Discharge: HOME | End: 2024-02-29
Admitting: INTERNAL MEDICINE
Payer: COMMERCIAL

## 2024-02-29 DIAGNOSIS — J41.8 MIXED SIMPLE AND MUCOPURULENT CHRONIC BRONCHITIS: ICD-10-CM

## 2024-02-29 DIAGNOSIS — J45.40 MODERATE PERSISTENT ASTHMA, UNSPECIFIED WHETHER COMPLICATED: ICD-10-CM

## 2024-02-29 DIAGNOSIS — E66.01 MORBID (SEVERE) OBESITY DUE TO EXCESS CALORIES: ICD-10-CM

## 2024-02-29 PROCEDURE — 94060 EVALUATION OF WHEEZING: CPT

## 2024-02-29 PROCEDURE — 94729 DIFFUSING CAPACITY: CPT

## 2024-02-29 PROCEDURE — 94726 PLETHYSMOGRAPHY LUNG VOLUMES: CPT

## 2024-02-29 RX ORDER — ALBUTEROL SULFATE 2.5 MG/3ML
2.5 SOLUTION RESPIRATORY (INHALATION) ONCE
Status: COMPLETED | OUTPATIENT
Start: 2024-02-29 | End: 2024-02-29

## 2024-02-29 RX ADMIN — ALBUTEROL SULFATE 2.5 MG: 2.5 SOLUTION RESPIRATORY (INHALATION) at 09:47

## 2024-03-07 ENCOUNTER — TELEPHONE (OUTPATIENT)
Dept: PULMONOLOGY | Facility: CLINIC | Age: 59
End: 2024-03-07
Payer: COMMERCIAL

## 2024-03-07 DIAGNOSIS — J41.8 MIXED SIMPLE AND MUCOPURULENT CHRONIC BRONCHITIS: Primary | ICD-10-CM

## 2024-03-07 RX ORDER — FLUTICASONE PROPIONATE AND SALMETEROL 250; 50 UG/1; UG/1
1 POWDER RESPIRATORY (INHALATION)
Qty: 60 EACH | Refills: 11 | Status: SHIPPED | OUTPATIENT
Start: 2024-03-07

## 2024-03-15 NOTE — ED PROVIDER NOTES
Subjective   Patient complaining of rash to right cheek bilateral hands and forearms worsening over the last couple of days.  Patient states rash is itchy and believes she may came in contact with poison ivy.      History provided by:  Patient   used: No    Poison Ivy  Location:  Right cheek, bilateral hands and forearms.  Quality:  Itchy  Severity:  Moderate  Onset quality:  Sudden  Duration: Several days.  Timing:  Constant  Progression:  Worsening  Chronicity:  New  Associated symptoms: rash    Associated symptoms: no abdominal pain, no chest pain, no congestion, no cough, no diarrhea, no ear pain, no fatigue, no fever, no headaches, no myalgias, no nausea, no shortness of breath, no sore throat and no vomiting        Review of Systems   Constitutional: Negative for chills, fatigue and fever.   HENT: Negative for congestion, ear pain and sore throat.    Eyes: Negative for pain.   Respiratory: Negative for cough, chest tightness and shortness of breath.    Cardiovascular: Negative for chest pain.   Gastrointestinal: Negative for abdominal pain, diarrhea, nausea and vomiting.   Genitourinary: Negative for flank pain and hematuria.   Musculoskeletal: Negative for joint swelling and myalgias.   Skin: Positive for rash. Negative for pallor.   Neurological: Negative for seizures and headaches.   All other systems reviewed and are negative.      Past Medical History:   Diagnosis Date   • Asthma    • Chronic obstructive pulmonary disease (CMS/[facility])    • Diabetes (CMS/[facility])    • H/O shoulder surgery 08/31/2017    aftercare following right shoulder arthroscopy    • Limb swelling        Allergies   Allergen Reactions   • Motrin [Ibuprofen] Swelling   • Nsaids Swelling and GI Intolerance       Past Surgical History:   Procedure Laterality Date   • BACK SURGERY     • KNEE SURGERY     • OTHER SURGICAL HISTORY      joint surgery    • SHOULDER ARTHROSCOPY     • TONSILLECTOMY         Family History   Problem  Relation Age of Onset   • Arthritis Mother         family history of certain chronic disabling diseases    • Cancer Father         unspecified   • Diabetes Father         unspecified type   • Cancer Sister         unspecified   • Diabetes Sister         unspecified type       Social History     Socioeconomic History   • Marital status:      Spouse name: Not on file   • Number of children: Not on file   • Years of education: Not on file   • Highest education level: Not on file   Tobacco Use   • Smoking status: Former Smoker   • Smokeless tobacco: Never Used   • Tobacco comment: 10/20/2017   Vaping Use   • Vaping Use: Never assessed   Substance and Sexual Activity   • Alcohol use: Yes     Comment: occasionally drinks    • Drug use: Never   • Sexual activity: Defer           Objective   Physical Exam  Constitutional:       General: She is not in acute distress.     Appearance: Normal appearance. She is not toxic-appearing.   HENT:      Head: Normocephalic.      Mouth/Throat:      Mouth: Mucous membranes are dry.      Pharynx: Oropharynx is clear.   Eyes:      Extraocular Movements: Extraocular movements intact.      Conjunctiva/sclera: Conjunctivae normal.      Pupils: Pupils are equal, round, and reactive to light.   Cardiovascular:      Rate and Rhythm: Normal rate and regular rhythm.      Pulses: Normal pulses.   Pulmonary:      Effort: Pulmonary effort is normal. No respiratory distress.      Breath sounds: No stridor. No wheezing, rhonchi or rales.   Abdominal:      General: Abdomen is flat. Bowel sounds are normal.      Palpations: Abdomen is soft.   Musculoskeletal:         General: No swelling, tenderness or deformity. Normal range of motion.      Cervical back: Normal range of motion.      Right lower leg: No edema.      Left lower leg: No edema.   Skin:     General: Skin is warm and dry.      Findings: Rash present.      Comments: Rash noted on right cheek right ear bilateral hands and forearms  consistent with a contact dermatitis.   Neurological:      General: No focal deficit present.      Mental Status: She is alert. Mental status is at baseline.      Cranial Nerves: Cranial nerves are intact.      Sensory: Sensation is intact.      Motor: Motor function is intact.      Coordination: Coordination is intact.   Psychiatric:         Attention and Perception: Attention and perception normal.         Mood and Affect: Mood normal.         Speech: Speech normal.         Behavior: Behavior normal. Behavior is cooperative.         Procedures           ED Course                                           MDM      Final diagnoses:   Contact dermatitis and eczema   Poison ivy dermatitis       ED Disposition  ED Disposition     ED Disposition Condition Comment    Discharge Stable           Chanel Heikekarla Rahman, APRN  [address]  [address]  [place] [address]  [phone]    In 3 days           Medication List      New Prescriptions    hydrOXYzine 25 MG tablet  Commonly known as: ATARAX  Take 1 tablet by mouth 3 (Three) Times a Day As Needed for Itching.     predniSONE 20 MG tablet  Commonly known as: DELTASONE  Take 2 tablets by mouth Daily.     triamcinolone 0.1 % ointment  Commonly known as: KENALOG  Apply  topically to the appropriate area as directed 2 (Two) Times a Day for 5 days.           Where to Get Your Medications      These medications were sent to [id number] - [place], [place] - [address] AT [facility] - [phone]  - [phone]   [address] [place] [address]    Hours: 24-hours Phone: [phone]   · hydrOXYzine 25 MG tablet  · predniSONE 20 MG tablet  · triamcinolone 0.1 % ointment          Deon Medina, GINA  07/18/21 8497     60 50

## 2024-04-09 ENCOUNTER — OFFICE VISIT (OUTPATIENT)
Dept: PULMONOLOGY | Facility: CLINIC | Age: 59
End: 2024-04-09
Payer: COMMERCIAL

## 2024-04-09 VITALS
HEIGHT: 63 IN | HEART RATE: 88 BPM | OXYGEN SATURATION: 91 % | RESPIRATION RATE: 18 BRPM | WEIGHT: 282 LBS | BODY MASS INDEX: 49.96 KG/M2 | TEMPERATURE: 98.7 F | DIASTOLIC BLOOD PRESSURE: 83 MMHG | SYSTOLIC BLOOD PRESSURE: 142 MMHG

## 2024-04-09 DIAGNOSIS — J44.1 COPD WITH ACUTE EXACERBATION: Primary | ICD-10-CM

## 2024-04-09 DIAGNOSIS — E66.01 MORBID (SEVERE) OBESITY DUE TO EXCESS CALORIES: ICD-10-CM

## 2024-04-09 DIAGNOSIS — J45.40 MODERATE PERSISTENT ASTHMA, UNSPECIFIED WHETHER COMPLICATED: ICD-10-CM

## 2024-04-09 PROCEDURE — 99214 OFFICE O/P EST MOD 30 MIN: CPT | Performed by: INTERNAL MEDICINE

## 2024-04-09 RX ORDER — PREDNISONE 10 MG/1
TABLET ORAL
Qty: 31 TABLET | Refills: 0 | Status: SHIPPED | OUTPATIENT
Start: 2024-04-09

## 2024-04-09 RX ORDER — BENZONATATE 100 MG/1
100 CAPSULE ORAL 3 TIMES DAILY PRN
COMMUNITY
Start: 2024-03-28

## 2024-04-09 RX ORDER — POLYMYXIN B SULFATE AND TRIMETHOPRIM 1; 10000 MG/ML; [USP'U]/ML
1 SOLUTION OPHTHALMIC EVERY 6 HOURS
COMMUNITY
Start: 2024-03-28

## 2024-04-09 RX ORDER — AMOXICILLIN 500 MG/1
500 CAPSULE ORAL 3 TIMES DAILY
Qty: 21 CAPSULE | Refills: 0 | Status: SHIPPED | OUTPATIENT
Start: 2024-04-09 | End: 2024-04-16

## 2024-04-09 RX ORDER — BENZONATATE 200 MG/1
200 CAPSULE ORAL 2 TIMES DAILY PRN
Qty: 60 CAPSULE | Refills: 3 | Status: SHIPPED | OUTPATIENT
Start: 2024-04-09

## 2024-04-09 RX ORDER — PREDNISONE 20 MG/1
2 TABLET ORAL DAILY
COMMUNITY
Start: 2024-03-28

## 2024-04-09 NOTE — PROGRESS NOTES
Pulmonary Office Follow-up    Subjective     Ann Marie Bunch is seen today at the office for   Chief Complaint   Patient presents with    Follow-up     3 MONTH    Results    Asthma    Shortness of Breath    Cough         HPI  Ann Marie Bunch is a 59 y.o. female with a PMH significant for COPD and bronchial asthma presents for follow-up patient has been having worsening breathlessness along with cough and chest congestion for the past couple of weeks she was placed on some Zithromax and prednisone but she continues to be breathless with chest aching patient had a chest x-ray done she denies any fever or hemoptysis      Tobacco use history:  Former smoker      Patient Active Problem List   Diagnosis    Aftercare following right knee arthroscopy with partial medial menisectomy, chondroplasty and removal of loose bodies    Primary osteoarthritis of right knee    Left renal mass    S/p nephrectomy    Renal cancer, left    OAB (overactive bladder)       Review of Systems  Review of Systems   Respiratory:  Positive for cough, shortness of breath and wheezing.    All other systems reviewed and are negative.    As described in the HPI. Otherwise, remainder of ROS (14 systems) were negative.    Medications, Allergies, Social, and Family Histories reviewed as per EMR.    Result Review :            Objective     Vitals:    04/09/24 1056   BP: 142/83   Pulse: 88   Resp: 18   Temp: 98.7 °F (37.1 °C)   SpO2: 91%         04/09/24  1056   Weight: 128 kg (282 lb)       Physical Exam  Vitals and nursing note reviewed.   Constitutional:       Appearance: She is obese.   HENT:      Head: Normocephalic and atraumatic.      Nose: Nose normal.      Mouth/Throat:      Mouth: Mucous membranes are moist.      Pharynx: Oropharynx is clear.   Eyes:      Extraocular Movements: Extraocular movements intact.      Conjunctiva/sclera: Conjunctivae normal.      Pupils: Pupils are equal, round, and reactive to light.   Cardiovascular:      Rate  and Rhythm: Normal rate and regular rhythm.      Pulses: Normal pulses.      Heart sounds: Normal heart sounds.   Pulmonary:      Effort: Pulmonary effort is normal.      Breath sounds: Wheezing and rhonchi present.   Abdominal:      General: Abdomen is flat. Bowel sounds are normal.      Palpations: Abdomen is soft.   Musculoskeletal:         General: Normal range of motion.      Cervical back: Normal range of motion and neck supple.   Skin:     General: Skin is warm.      Capillary Refill: Capillary refill takes 2 to 3 seconds.   Neurological:      General: No focal deficit present.      Mental Status: She is alert and oriented to person, place, and time.   Psychiatric:         Mood and Affect: Mood normal.         Behavior: Behavior normal.         No radiology results for the last 90 days.     Assessment & Plan     Diagnoses and all orders for this visit:    1. COPD with acute exacerbation (Primary)    2. Moderate persistent asthma, unspecified whether complicated    3. Morbid (severe) obesity due to excess calories    Other orders  -     predniSONE (DELTASONE) 10 MG tablet; Take 4 tabs daily x 3 days, then take 3 tabs daily x 3 days, then take 2 tabs daily x 3 days, then take 1 tab daily x 3 days  Dispense: 31 tablet; Refill: 0  -     amoxicillin (AMOXIL) 500 MG capsule; Take 1 capsule by mouth 3 (Three) Times a Day for 7 days.  Dispense: 21 capsule; Refill: 0  -     benzonatate (TESSALON) 200 MG capsule; Take 1 capsule by mouth 2 (Two) Times a Day As Needed for Cough.  Dispense: 60 capsule; Refill: 3         Discussion/ Recommendations:   Chest x-ray reviewed no infiltrates noted  Will start her on Amoxil and prednisone for 1 week  Continue Advair twice daily  Spiriva daily  Tessalon Perles twice daily  Patient is advised to reduce weight her BMI is 49.95  Vaccinations discussed and recommended             Return in about 3 months (around 7/9/2024).          This document has been electronically signed by  Jerson Mata MD on April 9, 2024 11:05 EDT

## 2024-06-26 ENCOUNTER — TELEPHONE (OUTPATIENT)
Dept: ORTHOPEDIC SURGERY | Facility: CLINIC | Age: 59
End: 2024-06-26

## 2024-06-26 NOTE — TELEPHONE ENCOUNTER
PLEASE CALL / LEAVE VMAIL TO DISCUSS ANTHEM [employer] PRIOR AUTH STATUS & SCHEDULING FOR     FOLLOW UP / RIGHT KNEE / NO NEW INJURY SINCE 01-24-24 XR & SYNVISC INJECTION (WANTS ANOTHER)     PATIENT REPORTED SHE HAD RIGHT KNEE MRI DONE MAY 2024 AT [facility] AS ORDERED BY RHEUMATOLOGIST DR CHANTAL AGUIRRE (REPORT NOT SHOWING IN CARE EVERYWHERE - PATIENT INFORMED TO GET IMAGING DISC)     THANKS

## 2024-07-02 NOTE — TELEPHONE ENCOUNTER
CALLED AND LEFT A MSG TO THE PT REGARDING HER APPT (7-2-24)    APPT:  8-5 AT 9:30 AM RT KNEE SYNVISC ONE INJ WITH MALI/GODFREY GU REQ PER LORIE/SAM    LAST RT KNEE SYNVISC ONE INJ:  1-24    MALINA   patient with HX of psoriasis complaining of out break and feeling SOB, itchy redness

## 2024-07-17 ENCOUNTER — OFFICE VISIT (OUTPATIENT)
Dept: PULMONOLOGY | Facility: CLINIC | Age: 59
End: 2024-07-17
Payer: COMMERCIAL

## 2024-07-17 VITALS
OXYGEN SATURATION: 94 % | RESPIRATION RATE: 18 BRPM | DIASTOLIC BLOOD PRESSURE: 75 MMHG | TEMPERATURE: 97.9 F | HEIGHT: 63 IN | HEART RATE: 66 BPM | WEIGHT: 281.4 LBS | BODY MASS INDEX: 49.86 KG/M2 | SYSTOLIC BLOOD PRESSURE: 130 MMHG

## 2024-07-17 DIAGNOSIS — J44.1 COPD WITH ACUTE EXACERBATION: Primary | ICD-10-CM

## 2024-07-17 DIAGNOSIS — J41.8 MIXED SIMPLE AND MUCOPURULENT CHRONIC BRONCHITIS: ICD-10-CM

## 2024-07-17 DIAGNOSIS — E66.01 MORBID (SEVERE) OBESITY DUE TO EXCESS CALORIES: ICD-10-CM

## 2024-07-17 PROCEDURE — 99214 OFFICE O/P EST MOD 30 MIN: CPT | Performed by: INTERNAL MEDICINE

## 2024-07-17 RX ORDER — BENZONATATE 200 MG/1
200 CAPSULE ORAL 2 TIMES DAILY PRN
Qty: 60 CAPSULE | Refills: 3 | Status: SHIPPED | OUTPATIENT
Start: 2024-07-17

## 2024-07-17 RX ORDER — AZITHROMYCIN 250 MG/1
TABLET, FILM COATED ORAL
Qty: 6 TABLET | Refills: 0 | Status: SHIPPED | OUTPATIENT
Start: 2024-07-17

## 2024-07-17 RX ORDER — FLUTICASONE PROPIONATE AND SALMETEROL XINAFOATE 230; 21 UG/1; UG/1
2 AEROSOL, METERED RESPIRATORY (INHALATION)
Qty: 1 EACH | Refills: 11 | Status: SHIPPED | OUTPATIENT
Start: 2024-07-17

## 2024-07-17 NOTE — PROGRESS NOTES
Pulmonary Office Follow-up    Subjective     Ann Marie Bunch is seen today at the office for   Chief Complaint   Patient presents with    Follow-up     3 month    Cough    Bronchitis    COPD         HPI  Ann Marie Bunch is a 59 y.o. female with a PMH significant for COPD and tobacco abuse presents for follow-up patient complains of some chest congestion and cough she denies any chest pain fever or hemoptysis patient is compliant with her medications she has already quit smoking      Tobacco use history:  Former smoker      Patient Active Problem List   Diagnosis    Aftercare following right knee arthroscopy with partial medial menisectomy, chondroplasty and removal of loose bodies    Primary osteoarthritis of right knee    Left renal mass    S/p nephrectomy    Renal cancer, left    OAB (overactive bladder)       Review of Systems  Review of Systems   Respiratory:  Positive for cough and shortness of breath.    All other systems reviewed and are negative.    As described in the HPI. Otherwise, remainder of ROS (14 systems) were negative.    Medications, Allergies, Social, and Family Histories reviewed as per EMR.    Result Review :            Objective     Vitals:    07/17/24 1002   BP: 130/75   Pulse: 66   Resp: 18   Temp: 97.9 °F (36.6 °C)   SpO2: 94%         07/17/24  1002   Weight: 128 kg (281 lb 6.4 oz)       Physical Exam  Vitals and nursing note reviewed.   Constitutional:       Appearance: She is obese.   HENT:      Head: Normocephalic and atraumatic.      Nose: Nose normal.      Mouth/Throat:      Mouth: Mucous membranes are moist.      Pharynx: Oropharynx is clear.   Eyes:      Extraocular Movements: Extraocular movements intact.      Conjunctiva/sclera: Conjunctivae normal.      Pupils: Pupils are equal, round, and reactive to light.   Cardiovascular:      Rate and Rhythm: Normal rate and regular rhythm.      Pulses: Normal pulses.      Heart sounds: Normal heart sounds.   Pulmonary:      Effort:  Pulmonary effort is normal.      Breath sounds: Wheezing and rhonchi present.   Abdominal:      General: Abdomen is flat. Bowel sounds are normal.      Palpations: Abdomen is soft.   Musculoskeletal:         General: Normal range of motion.      Cervical back: Normal range of motion and neck supple.   Skin:     General: Skin is warm.      Capillary Refill: Capillary refill takes 2 to 3 seconds.   Neurological:      General: No focal deficit present.      Mental Status: She is alert and oriented to person, place, and time.   Psychiatric:         Mood and Affect: Mood normal.         Behavior: Behavior normal.         No radiology results for the last 90 days.     Assessment & Plan     Diagnoses and all orders for this visit:    1. COPD with acute exacerbation (Primary)  -     fluticasone-salmeterol (Advair HFA) 230-21 MCG/ACT inhaler; Inhale 2 puffs 2 (Two) Times a Day.  Dispense: 1 each; Refill: 11    2. Mixed simple and mucopurulent chronic bronchitis  -     fluticasone-salmeterol (Advair HFA) 230-21 MCG/ACT inhaler; Inhale 2 puffs 2 (Two) Times a Day.  Dispense: 1 each; Refill: 11    3. Morbid (severe) obesity due to excess calories    Other orders  -     azithromycin (ZITHROMAX) 250 MG tablet; Take 2 by mouth today then 1 daily for 4 days  Dispense: 6 tablet; Refill: 0  -     benzonatate (TESSALON) 200 MG capsule; Take 1 capsule by mouth 2 (Two) Times a Day As Needed for Cough.  Dispense: 60 capsule; Refill: 3         Discussion/ Recommendations:   Will start her on Zithromax Z-Derrick  Continue Advair twice daily  Continue Spiriva daily  Albuterol inhaler 2 puffs every 6 hours as needed  Advised to reduce weight her BMI is 49.85  Vaccinations discussed and recommended             Return in about 4 months (around 11/17/2024).          This document has been electronically signed by Jerson Mata MD on July 17, 2024 10:07 EDT

## 2024-07-30 ENCOUNTER — TELEPHONE (OUTPATIENT)
Dept: PULMONOLOGY | Facility: CLINIC | Age: 59
End: 2024-07-30

## 2024-07-30 NOTE — TELEPHONE ENCOUNTER
Pharmacy called stating medication order was submitted for Wixela. Pharmacist stated that the strength was not on the order and dosage was not correct. As well as the signature did look right. Spoke to Jazmin and everything looks correct. Jazmin is calling them back. Number is [phone]. Reference number [id number].

## 2024-08-05 ENCOUNTER — OFFICE VISIT (OUTPATIENT)
Dept: ORTHOPEDIC SURGERY | Facility: CLINIC | Age: 59
End: 2024-08-05
Payer: COMMERCIAL

## 2024-08-05 VITALS
DIASTOLIC BLOOD PRESSURE: 83 MMHG | HEIGHT: 63 IN | BODY MASS INDEX: 49.79 KG/M2 | HEART RATE: 67 BPM | WEIGHT: 281 LBS | SYSTOLIC BLOOD PRESSURE: 130 MMHG | OXYGEN SATURATION: 92 %

## 2024-08-05 DIAGNOSIS — M25.561 CHRONIC PAIN OF RIGHT KNEE: ICD-10-CM

## 2024-08-05 DIAGNOSIS — M17.11 PRIMARY OSTEOARTHRITIS OF RIGHT KNEE: Primary | ICD-10-CM

## 2024-08-05 DIAGNOSIS — G89.29 CHRONIC PAIN OF RIGHT KNEE: ICD-10-CM

## 2024-08-05 DIAGNOSIS — E66.01 OBESITY, MORBID, BMI 40.0-49.9: ICD-10-CM

## 2024-08-05 PROCEDURE — 20610 DRAIN/INJ JOINT/BURSA W/O US: CPT

## 2024-08-05 RX ORDER — HYDROCODONE BITARTRATE AND ACETAMINOPHEN 7.5; 325 MG/1; MG/1
1 TABLET ORAL EVERY 6 HOURS PRN
COMMUNITY

## 2024-09-16 ENCOUNTER — OFFICE VISIT (OUTPATIENT)
Dept: ORTHOPEDIC SURGERY | Facility: CLINIC | Age: 59
End: 2024-09-16
Payer: COMMERCIAL

## 2024-09-16 VITALS
OXYGEN SATURATION: 93 % | DIASTOLIC BLOOD PRESSURE: 79 MMHG | HEART RATE: 72 BPM | BODY MASS INDEX: 49.79 KG/M2 | HEIGHT: 63 IN | SYSTOLIC BLOOD PRESSURE: 158 MMHG | WEIGHT: 281 LBS

## 2024-09-16 DIAGNOSIS — E66.01 OBESITY, MORBID, BMI 40.0-49.9: ICD-10-CM

## 2024-09-16 DIAGNOSIS — G89.29 CHRONIC PAIN OF RIGHT KNEE: ICD-10-CM

## 2024-09-16 DIAGNOSIS — M17.11 PRIMARY OSTEOARTHRITIS OF RIGHT KNEE: Primary | ICD-10-CM

## 2024-09-16 DIAGNOSIS — M25.561 CHRONIC PAIN OF RIGHT KNEE: ICD-10-CM

## 2024-09-16 PROCEDURE — 99213 OFFICE O/P EST LOW 20 MIN: CPT

## 2024-09-16 PROCEDURE — 20610 DRAIN/INJ JOINT/BURSA W/O US: CPT

## 2024-09-16 RX ORDER — TRIAMCINOLONE ACETONIDE 40 MG/ML
40 INJECTION, SUSPENSION INTRA-ARTICULAR; INTRAMUSCULAR
Status: COMPLETED | OUTPATIENT
Start: 2024-09-16 | End: 2024-09-16

## 2024-09-16 RX ORDER — LIDOCAINE HYDROCHLORIDE 10 MG/ML
5 INJECTION, SOLUTION INFILTRATION; PERINEURAL
Status: COMPLETED | OUTPATIENT
Start: 2024-09-16 | End: 2024-09-16

## 2024-09-16 RX ADMIN — TRIAMCINOLONE ACETONIDE 40 MG: 40 INJECTION, SUSPENSION INTRA-ARTICULAR; INTRAMUSCULAR at 10:35

## 2024-09-16 RX ADMIN — LIDOCAINE HYDROCHLORIDE 5 ML: 10 INJECTION, SOLUTION INFILTRATION; PERINEURAL at 10:35

## 2024-11-21 ENCOUNTER — OFFICE VISIT (OUTPATIENT)
Dept: PULMONOLOGY | Facility: CLINIC | Age: 59
End: 2024-11-21
Payer: COMMERCIAL

## 2024-11-21 VITALS
HEART RATE: 75 BPM | TEMPERATURE: 97.4 F | SYSTOLIC BLOOD PRESSURE: 128 MMHG | BODY MASS INDEX: 48.8 KG/M2 | DIASTOLIC BLOOD PRESSURE: 83 MMHG | HEIGHT: 63 IN | OXYGEN SATURATION: 90 % | WEIGHT: 275.4 LBS | RESPIRATION RATE: 14 BRPM

## 2024-11-21 DIAGNOSIS — J41.8 MIXED SIMPLE AND MUCOPURULENT CHRONIC BRONCHITIS: Primary | ICD-10-CM

## 2024-11-21 DIAGNOSIS — J45.40 MODERATE PERSISTENT ASTHMA, UNSPECIFIED WHETHER COMPLICATED: ICD-10-CM

## 2024-11-21 DIAGNOSIS — E66.01 MORBID (SEVERE) OBESITY DUE TO EXCESS CALORIES: ICD-10-CM

## 2024-11-21 RX ORDER — ALBUTEROL SULFATE 5 MG/ML
1.5 SOLUTION RESPIRATORY (INHALATION) EVERY 6 HOURS PRN
Qty: 200 EACH | Refills: 2 | Status: SHIPPED | OUTPATIENT
Start: 2024-11-21

## 2024-11-21 NOTE — PROGRESS NOTES
Pulmonary Office Follow-up    Subjective     Ann Marie Bunch is seen today at the office for   Chief Complaint   Patient presents with    Follow-up     4 month    COPD         HPI  Ann Marie Bunch is a 59 y.o. female with a PMH significant for COPD and asthma with obesity presents for  follow-up patient has been doing well and is compliant with her medications she does not smoke anymore she complains of scant mucoid expectoration off-and-on and has been fairly active      Tobacco use history:  Former smoker      Patient Active Problem List   Diagnosis    Aftercare following right knee arthroscopy with partial medial menisectomy, chondroplasty and removal of loose bodies    Primary osteoarthritis of right knee    Left renal mass    S/p nephrectomy    Renal cancer, left    OAB (overactive bladder)       Review of Systems  Review of Systems   Respiratory:  Positive for shortness of breath.    All other systems reviewed and are negative.    As described in the HPI. Otherwise, remainder of ROS (14 systems) were negative.    Medications, Allergies, Social, and Family Histories reviewed as per EMR.    Result Review :            Objective     Vitals:    11/21/24 1013   BP: 128/83   Pulse: 75   Resp: 14   Temp: 97.4 °F (36.3 °C)   SpO2: 90%         11/21/24  1013   Weight: 125 kg (275 lb 6.4 oz)       Physical Exam  Vitals and nursing note reviewed.   Constitutional:       Appearance: She is obese.   HENT:      Head: Normocephalic and atraumatic.      Nose: Nose normal.      Mouth/Throat:      Pharynx: Oropharynx is clear.   Eyes:      Extraocular Movements: Extraocular movements intact.      Conjunctiva/sclera: Conjunctivae normal.      Pupils: Pupils are equal, round, and reactive to light.   Cardiovascular:      Rate and Rhythm: Normal rate and regular rhythm.      Pulses: Normal pulses.      Heart sounds: Normal heart sounds.   Pulmonary:      Effort: Pulmonary effort is normal.      Breath sounds: Rhonchi present.    Musculoskeletal:         General: Normal range of motion.      Cervical back: Normal range of motion and neck supple.   Skin:     General: Skin is warm.      Capillary Refill: Capillary refill takes 2 to 3 seconds.   Neurological:      Mental Status: She is alert and oriented to person, place, and time.   Psychiatric:         Mood and Affect: Mood normal.         Behavior: Behavior normal.         XR Tibia Fibula 2 View Right    Result Date: 9/20/2024  Impression: 1. Degenerative arthrosis in the right knee with moderate findings in the medial compartment. 2. Unremarkable exam of the right tibia and fibula. Electronically Signed: Sherine Stoll MD  9/20/2024 2:14 PM EDT  Workstation ID: [id number]    XR Knee 3+ View With [place] Right    Result Date: 9/20/2024  Impression: 1. Degenerative arthrosis in the right knee with moderate findings in the medial compartment. 2. Unremarkable exam of the right tibia and fibula. Electronically Signed: Sherine Stoll MD  9/20/2024 2:14 PM EDT  Workstation ID: [id number]      Assessment & Plan     Diagnoses and all orders for this visit:    1. Mixed simple and mucopurulent chronic bronchitis (Primary)    2. Morbid (severe) obesity due to excess calories    3. Moderate persistent asthma, unspecified whether complicated    Other orders  -     albuterol (PROVENTIL) (5 MG/ML) 0.5% nebulizer solution; Take 0.3 mL by nebulization Every 6 (Six) Hours As Needed for Wheezing. SOLUTION IS 1.25 MG PER 3 ML  Dispense: 200 each; Refill: 2         Discussion/ Recommendations:   Patient is encouraged to reduce weight  She has already quit smoking  Refill Advair and Spiriva  Refill neb treatments as needed  Her BMI is 48.78  Vaccinations discussed and recommended             Return in about 4 months (around 3/21/2025).          This document has been electronically signed by Jerson Mata MD on November 21, 2024 10:28 EST

## 2024-12-03 ENCOUNTER — TELEPHONE (OUTPATIENT)
Dept: UROLOGY | Age: 59
End: 2024-12-03
Payer: COMMERCIAL

## 2024-12-03 NOTE — TELEPHONE ENCOUNTER
Scheduled CT for 12/10/24 at 9:00 with an arrival time of 8:45. Patient asked that I call back and leave it on voice mail as well as the appointment with Valente on 1/27/25 at 9:30. I called and left the information on the voice mail.

## 2024-12-18 ENCOUNTER — OFFICE VISIT (OUTPATIENT)
Dept: ORTHOPEDIC SURGERY | Facility: CLINIC | Age: 59
End: 2024-12-18
Payer: COMMERCIAL

## 2024-12-18 VITALS
WEIGHT: 275.57 LBS | BODY MASS INDEX: 48.83 KG/M2 | HEART RATE: 72 BPM | HEIGHT: 63 IN | DIASTOLIC BLOOD PRESSURE: 83 MMHG | SYSTOLIC BLOOD PRESSURE: 156 MMHG | OXYGEN SATURATION: 96 %

## 2024-12-18 DIAGNOSIS — M25.561 MEDIAL KNEE PAIN, RIGHT: ICD-10-CM

## 2024-12-18 DIAGNOSIS — M17.11 PRIMARY OSTEOARTHRITIS OF RIGHT KNEE: Primary | ICD-10-CM

## 2024-12-18 RX ORDER — LIDOCAINE HYDROCHLORIDE 10 MG/ML
5 INJECTION, SOLUTION INFILTRATION; PERINEURAL
Status: COMPLETED | OUTPATIENT
Start: 2024-12-18 | End: 2024-12-18

## 2024-12-18 RX ORDER — TRIAMCINOLONE ACETONIDE 40 MG/ML
40 INJECTION, SUSPENSION INTRA-ARTICULAR; INTRAMUSCULAR
Status: COMPLETED | OUTPATIENT
Start: 2024-12-18 | End: 2024-12-18

## 2024-12-18 RX ADMIN — TRIAMCINOLONE ACETONIDE 40 MG: 40 INJECTION, SUSPENSION INTRA-ARTICULAR; INTRAMUSCULAR at 09:04

## 2024-12-18 RX ADMIN — LIDOCAINE HYDROCHLORIDE 5 ML: 10 INJECTION, SOLUTION INFILTRATION; PERINEURAL at 09:04

## 2024-12-18 NOTE — PROGRESS NOTES
"Chief Complaint  Pain and Follow-up of the Right Knee    Subjective      Ann Marie Bunch presents to [facility] for her right knee.  Patient has a history of right knee pain osteoarthritis that she has been managing conservatively.  Patient was last seen in office on 9/16/2024 and received a right knee steroid injection.  Patient states that the steroid injection did help but she is having quite a bit of increased pain along the medial aspect of her knee.  She is inquiring about repeat an MRI due to the increase in pain and the no further resolution of the pain with the injections.    Objective   Allergies   Allergen Reactions    Motrin [Ibuprofen] Nausea And Vomiting and Swelling    Nsaids Swelling and GI Intolerance    Aspirin Nausea Only and Swelling     STATES \"TEARS HER STOMACH UP\" EVEN WITH FOOD    Adhesive Tape Other (See Comments)     Redness and bruising       Vital Signs:   /83   Pulse 72   Ht 160 cm (63\")   Wt 125 kg (275 lb 9.2 oz)   SpO2 96%   BMI 48.82 kg/m²       Physical Exam    Constitutional: Awake, alert. Well nourished appearance.    Integumentary: Warm, dry, intact. No obvious rashes.    HENT: Atraumatic, normocephalic.   Respiratory: Non labored respirations .   Cardiovascular: Intact peripheral pulses.    Psychiatric: Normal mood and affect. A&O X3    Ortho Exam  General: Alert, no acute distress.   Right knee:   Knee stable to varus/valgus stress.  Knee extensor mechanism intact.  -5 degrees knee extension. Flexion to 115 degrees. Calf soft, non-tender.  Sensation and neurovascularly intact.  Demonstrates active ankle dorsiflexion and plantarflexion.  Palpable pedal pulses.            Imaging Results (Most Recent)       None             Large Joint Arthrocentesis: R knee  Date/Time: 12/18/2024 9:04 AM  Consent given by: patient  Site marked: site marked  Timeout: Immediately prior to procedure a time out was called to verify the correct patient, " procedure, equipment, support staff and site/side marked as required   Supporting Documentation  Indications: pain   Procedure Details  Location: knee - R knee  Preparation: Patient was prepped and draped in the usual sterile fashion  Needle gauge: 21 G.  Approach: lateral  Medications administered: 5 mL lidocaine 1 %; 40 mg triamcinolone acetonide 40 MG/ML  Patient tolerance: patient tolerated the procedure well with no immediate complications       This injection documentation was Scribed for GINA Winchester by Lupe Reich.  12/18/24   09:04 EST         Assessment and Plan   Problem List Items Addressed This Visit          Musculoskeletal and Injuries    Primary osteoarthritis of right knee - Primary    Relevant Orders    Large Joint Arthrocentesis: R knee    MRI Knee Right Without Contrast     Other Visit Diagnoses       Medial knee pain, right        Relevant Orders    Large Joint Arthrocentesis: R knee    MRI Knee Right Without Contrast            Ann Marie Bunch presents today to [facility] for the follow up of their right knee. They have right knee pain that we have been treating conservatively with intermittent injections.  Does have a history of a right knee arthroscopy several years ago.  Patient reports that she had a recent fall in January of this year which has increased her medial pain.  Injections do manage the pain but it does wear off.  Patient reports that the last injection did not completely make the pain go away and she is having an increase in swelling and pain.  Patient has difficulty with pivoting and movements of turning.  Patient is requesting a right knee MRI for further evaluation.  MRI was ordered.  Patient elected to proceed with right knee steroid injection as well.    We discussed the risks, benefits and alternatives of injections. Patient was informed of possible adverse effects including but not limited to bleeding, damage to nerve, tendon or artery, increased blood  sugar and increased blood pressure. Discussed possibility of a reaction from the injection.  Discussed the possibility that the injection may not completely improve or remove the pain.  Discussed the risk of infection.  Discussed the possibility of worsening pain after the injection.  Informed consent obtained.  Time out was performed. Patient was placed with knee in flexion at 90 degrees. Site marked inferior and lateral to patella.  Chlorhexidine was swabbed at injection site per typical technique. Needle injected into bursa, aspiration was performed and then right knee steroid slowly injected into joint space, fluid was free flowing. Needle was removed, band-aid placed to injection site.  Patient tolerated injection well with no complications.     Follow up after MRI.  Patient was advised to wait to schedule MRI until approximately 4 to 5 weeks after injection today.  Patient verbalized understanding.      Follow Up   Return for after MRI.    Tobacco Use: Medium Risk (12/18/2024)    Patient History     Smoking Tobacco Use: Former     Smokeless Tobacco Use: Never     Passive Exposure: Not on file       Educated on risk of smoking. Discussed options for smoking cessation.    There are no Patient Instructions on file for this visit.  Patient was given instructions and counseling regarding her condition or for health maintenance advice. Please see specific information pulled into the AVS if appropriate.

## 2025-01-27 ENCOUNTER — OFFICE VISIT (OUTPATIENT)
Dept: UROLOGY | Age: 60
End: 2025-01-27
Payer: COMMERCIAL

## 2025-01-27 VITALS — BODY MASS INDEX: 48.73 KG/M2 | HEIGHT: 63 IN | WEIGHT: 275 LBS

## 2025-01-27 DIAGNOSIS — N32.81 OAB (OVERACTIVE BLADDER): ICD-10-CM

## 2025-01-27 DIAGNOSIS — C64.2 RENAL CANCER, LEFT: Primary | ICD-10-CM

## 2025-01-27 PROCEDURE — 99213 OFFICE O/P EST LOW 20 MIN: CPT | Performed by: UROLOGY

## 2025-01-27 NOTE — PROGRESS NOTES
"Chief Complaint  Renal cancer, left    Subjective          Ann Marie Bunch presents to [facility]    History of Present Illness  Ms. Bunch is 1-1/2 years out from a laparoscopic partial nephrectomy which was done in July 2023.  Recent CT scan reveals no evidence of recurrence.  She does have a few renal cysts which are stable or smaller than they had been.      We reviewed her recent CT scan from December 2024 which was done at [facility].  It revealed no evidence of recurrence and no new masses.  She does have some calcifications at the resection bed and some new stones in her kidney.     In addition she is complaining of some overactive bladder with frequency urgency and some mild urge incontinence.  She does have urgency even worse at night.  Patient would like to try medication for this.      Oncology/Hematology History   Renal cancer, left   7/18/2023 Cancer Staged    Staging form: Kidney, AJCC 8th Edition  - Pathologic stage from 7/18/2023: Stage I (ypT1a, pN0, cM0) - Signed by Magali Victor MD on 7/26/2023 7/26/2023 Initial Diagnosis    Renal cancer, right      Imaging    CT scan in December 2024 reveals no evidence of recurrence or metastatic disease         Objective   Vital Signs:   Ht 160 cm (63\")   Wt 125 kg (275 lb)   BMI 48.71 kg/m²       Physical Exam  Vitals and nursing note reviewed.   Constitutional:       Appearance: Normal appearance. She is well-developed.   Pulmonary:      Effort: Pulmonary effort is normal.      Breath sounds: Normal air entry.   Neurological:      Mental Status: She is alert and oriented to person, place, and time.      Motor: Motor function is intact.   Psychiatric:         Mood and Affect: Mood normal.         Behavior: Behavior normal.          Result Review :   The following data was reviewed by: Magali Victor MD on 01/27/2025:    Results for orders placed or performed in visit on 01/22/24   POC Urinalysis Dipstick, " Automated    Collection Time: 01/22/24  9:58 AM    Specimen: Urine   Result Value Ref Range    Color Yellow Yellow, Straw, Dark Yellow, Maryse    Clarity, UA Clear Clear    Specific Gravity  1.030 1.005 - 1.030    pH, Urine 5.5 5.0 - 8.0    Leukocytes Negative Negative    Nitrite, UA Negative Negative    Protein, POC Negative Negative mg/dL    Glucose, UA Negative Negative mg/dL    Ketones, UA Negative Negative    Urobilinogen, UA Normal Normal, 0.2 E.U./dL    Bilirubin Negative Negative    Blood, UA Negative Negative    Lot Number [id number]     Expiration Date 123,024           I personally reviewed her CT scan from [facility] imaging today.  Results of that are detailed in HPI.           Assessment and Plan    Diagnoses and all orders for this visit:    1. Renal cancer, left (Primary)  Overview:  S/p Left robotic partial nephrectomy on 7/18/23    Orders:  -     CT Abdomen Pelvis With & Without Contrast; Future    2. OAB (overactive bladder)    Will repeat her CT scan in 1 year.  She does not want to take medicine for overactive bladder right now and still has some of the pills left from before.  If she decides to take it in wants a prescription she will let us know.  Otherwise I will see her back in 1 year or sooner if needed.        Follow Up       No follow-ups on file.  Patient was given instructions and counseling regarding her condition or for health maintenance advice. Please see specific information pulled into the AVS if appropriate.

## 2025-02-28 ENCOUNTER — TELEPHONE (OUTPATIENT)
Dept: ORTHOPEDIC SURGERY | Facility: CLINIC | Age: 60
End: 2025-02-28
Payer: COMMERCIAL

## 2025-02-28 NOTE — TELEPHONE ENCOUNTER
Caller: Ann Marie Bunch    Relationship: Self    Best call back number: [phone] (home)     What orders are you requesting (i.e. lab or imaging):     MRI Knee Right Without Contrast       Where will you receive your lab/imaging services: [facility]   FAX NUMBER: [phone]

## 2025-03-05 ENCOUNTER — HOSPITAL ENCOUNTER (OUTPATIENT)
Dept: OTHER | Facility: HOSPITAL | Age: 60
Discharge: HOME | End: 2025-03-05

## 2025-03-05 DIAGNOSIS — M17.11 PRIMARY OSTEOARTHRITIS OF RIGHT KNEE: ICD-10-CM

## 2025-03-05 DIAGNOSIS — M25.561 MEDIAL KNEE PAIN, RIGHT: ICD-10-CM

## 2025-03-07 ENCOUNTER — TELEPHONE (OUTPATIENT)
Dept: ORTHOPEDIC SURGERY | Facility: CLINIC | Age: 60
End: 2025-03-07
Payer: COMMERCIAL

## 2025-03-07 NOTE — TELEPHONE ENCOUNTER
LT VM TO INFORM PT TO CALL OFFICE SO WE CAN SCHEDULE A FOLLOW UP APT FOR HER MRI RIGHT KNEE RESULTS. OK FOR HUB TO SCHEDULE WITH MALI ON MONDAY, WEDNESDAY OR FRIDAY AFTERNOON.

## 2025-03-07 NOTE — TELEPHONE ENCOUNTER
Name: BunchAnn Marie nice      Relationship: Self      Best Callback Number: [phone]       HUB PROVIDED THE RELAY MESSAGE FROM THE OFFICE      PATIENT: SCHEDULED PER NOTE    ADDITIONAL INFORMATION:

## 2025-03-12 ENCOUNTER — OFFICE VISIT (OUTPATIENT)
Dept: ORTHOPEDIC SURGERY | Facility: CLINIC | Age: 60
End: 2025-03-12
Payer: COMMERCIAL

## 2025-03-12 VITALS — WEIGHT: 260.14 LBS | HEIGHT: 63 IN | BODY MASS INDEX: 46.09 KG/M2

## 2025-03-12 DIAGNOSIS — M17.11 PRIMARY OSTEOARTHRITIS OF RIGHT KNEE: Primary | ICD-10-CM

## 2025-03-12 DIAGNOSIS — G89.29 CHRONIC PAIN OF RIGHT KNEE: ICD-10-CM

## 2025-03-12 DIAGNOSIS — E66.01 OBESITY, MORBID, BMI 40.0-49.9: ICD-10-CM

## 2025-03-12 DIAGNOSIS — M25.561 CHRONIC PAIN OF RIGHT KNEE: ICD-10-CM

## 2025-03-12 RX ADMIN — TRIAMCINOLONE ACETONIDE 40 MG: 40 INJECTION, SUSPENSION INTRA-ARTICULAR; INTRAMUSCULAR at 10:59

## 2025-03-12 RX ADMIN — LIDOCAINE HYDROCHLORIDE 5 ML: 10 INJECTION, SOLUTION INFILTRATION; PERINEURAL at 10:59

## 2025-03-12 NOTE — PROGRESS NOTES
"Chief Complaint  Follow-up and Pain of the Right Knee    Subjective      Ann Marie Bunch presents to [facility] for follow up of their right knee.  Patient has a history of right knee pain osteoarthritis that she has been trying to manage conservatively.  She was last seen in office on 12/18/2024 and a right knee MRI was ordered.  She also received a right knee steroid injection at that time.  She returns to clinic today to follow-up on the right knee MRI.    Allergies   Allergen Reactions    Motrin [Ibuprofen] Nausea And Vomiting and Swelling    Nsaids Swelling and GI Intolerance    Aspirin Nausea Only and Swelling     STATES \"TEARS HER STOMACH UP\" EVEN WITH FOOD    Adhesive Tape Other (See Comments)     Redness and bruising       Objective     Vital Signs:   Vitals:    03/12/25 1021 03/12/25 1031   Weight: 125 kg (275 lb) 118 kg (260 lb 2.3 oz)   Height: 160 cm (63\")      Body mass index is 46.08 kg/m².    I reviewed the patient's chief complaint, history of present illness, review of systems, past medical history, surgical history, family history, social history, medications, and allergy list.     Ortho Exam    General: Alert, no acute distress.   Right Knee: No pain with passive hip ROM.  Knee stable to varus/valgus stress.  Knee extensor mechanism intact.  -3 degrees knee extension. Flexion to 115 degrees.  Tender to palpation over proximal tibia/distal knee.  Several areas of palpable nodules in that area from resolving hematoma.  Moderate swelling as well.  Calf soft, non-tender.  Sensation and neurovascularly intact.  Demonstrates active ankle dorsiflexion and plantarflexion.  Palpable pedal pulses.   RIGHT KNEE INJECTION: R knee  Date/Time: 3/12/2025 10:59 AM  Consent given by: patient  Site marked: site marked  Timeout: Immediately prior to procedure a time out was called to verify the correct patient, procedure, equipment, support staff and site/side marked as required "   Supporting Documentation  Indications: pain   Procedure Details  Location: knee - R knee  Needle gauge: 21G.  Medications administered: 5 mL lidocaine 1 %; 40 mg triamcinolone acetonide 40 MG/ML  Patient tolerance: patient tolerated the procedure well with no immediate complications       This injection documentation was Scribed for GINA Winchester by Adelaida Zapata MA.  03/12/25   11:00 EDT      Right knee MRI was performed at [facility] on 3/5/2025.    Findings: Anteriorly there is a focal fluid anterior to the tibia which appears to be relatively encapsulated and measures up to 4.0 x 4.1 x 8.5 cm in the largest AP and transverse and craniocaudal dimensions.  Medial compartment: Severe joint space loss with a severe chondrosis and reactive subchondral marrow edema and bone spurring.  There is vacuum phenomenon within the joint.  The posterior horn of meniscus appears to be macerated and the posterior horn root attachment appears to be nearly completely disrupted.  There is meniscal extrusion and degeneration.    Lateral compartment: Joint space loss with peripheral bony spurring.  Moderate cartilage thinning of the peripheral aspects of the femoral condyle weightbearing surface anterior as well as the anterior peripheral tibial plateau.  Irregular morphology and signal involving the anterior horn of the meniscus and the root attachment suggesting multidirectional tear.    Patellofemoral interval: Aligned but narrowed.  Mostly mild to moderate diffuse cartilage thinning across the entire patella.  There are areas of more severe cartilage loss present on the median ridge and the medial patella facet.  Bony spurring of the trochlea.  Mild cartilage thinning of the central trochlea with full-thickness cartilage defect and bone spur measuring up to 5 mm.  All ligaments intact.  Impression:  1.  Tricompartmental osteoarthritis with minimal joint effusion.  2. Large focal edema or fluid collection of the  anterior soft tissues.  3.  Near complete disruption of the posterior horn root attachment of the medial meniscus with meniscal extrusion.  4.  Multidirectional tear suspected the anterior horn of the lateral meniscus including partial tearing of the root attachment.          Assessment and Plan   Diagnoses and all orders for this visit:    1. Primary osteoarthritis of right knee (Primary)    2. Obesity, morbid, BMI 40.0-49.9    3. Chronic pain of right knee    Other orders  -     RIGHT KNEE INJECTION: R knee         Ann Marie Bunch presents today to [facility] for the follow up of their right knee.  MRI reviewed.  MRI reviewed with Dr. Clark.  Patient is a candidate for a right total knee arthroplasty but due to her elevated BMI she is not a good candidate for surgery.  She states that she has not really want to consider surgery but she is tired of feeling the way she does.  She states that since her fall in September of last year the knots on her lower leg have been severely painful and that is what is bothering her more than anything.  Dr. Calvillo recommended compression and massage over the areas as it seems like it is a resolving hematoma.    Advised patient that if she could possibly lose approximately 10 to 15 pounds her BMI would be in a better range and surgery could be considered at that time.  We discussed diet modifications for her elevated BMI and recommendations since activity is very difficult to her secondary to the knee pain.    Until then, she wants to treat her knee pain conservatively.  She is eligible for repeat right knee steroid injection today in office and elects to proceed.    We discussed the risks, benefits and alternatives of injections. Patient was informed of possible adverse effects including but not limited to bleeding, damage to nerve, tendon or artery, increased blood sugar and increased blood pressure. Discussed possibility of a reaction from the injection.  Discussed the  possibility that the injection may not completely improve or remove the pain.  Discussed the risk of infection.  Discussed the possibility of worsening pain after the injection.  Informed consent obtained.  Time out was performed. Patient was placed with knee in flexion at 90 degrees. Site marked inferior and lateral to patella.  Chlorhexidine was swabbed at injection site per typical technique. Needle injected into bursa, aspiration was performed and then right knee steroid slowly injected into joint space, fluid was free flowing. Needle was removed, band-aid placed to injection site.  Patient tolerated injection well with no complications.     Follow up in 3 months.    Body mass index is 46.08 kg/m².  The priority health goal(s) that the family would like to work on are: eat more fruits and vegetables, decrease soda or juice intake, increase water intake, reduce portion size, cut out extra servings, reduce fast food intake, and plan meals.         Tobacco Use: Medium Risk (3/12/2025)    Patient History     Smoking Tobacco Use: Former     Smokeless Tobacco Use: Never     Passive Exposure: Not on file     Patient reports they have a history of tobacco use; encouraged continued tobacco cessation for further health benefits.           Follow Up   No follow-ups on file.  There are no Patient Instructions on file for this visit.    Patient was given instructions and counseling regarding her condition or for health maintenance advice. Please see specific information pulled into the AVS if appropriate.     Dictated Utilizing Dragon Dictation. Please note that portions of this note were completed with a voice recognition program. Part of this note may be an electronic transcription/translation of spoken language to printed text using the Dragon Dictation System.

## 2025-03-13 RX ORDER — BENZONATATE 200 MG/1
200 CAPSULE ORAL 2 TIMES DAILY PRN
Qty: 60 CAPSULE | Refills: 3 | Status: SHIPPED | OUTPATIENT
Start: 2025-03-13

## 2025-03-14 RX ORDER — LIDOCAINE HYDROCHLORIDE 10 MG/ML
5 INJECTION, SOLUTION INFILTRATION; PERINEURAL
Status: COMPLETED | OUTPATIENT
Start: 2025-03-12 | End: 2025-03-12

## 2025-03-14 RX ORDER — TRIAMCINOLONE ACETONIDE 40 MG/ML
40 INJECTION, SUSPENSION INTRA-ARTICULAR; INTRAMUSCULAR
Status: COMPLETED | OUTPATIENT
Start: 2025-03-12 | End: 2025-03-12

## 2025-03-25 ENCOUNTER — OFFICE VISIT (OUTPATIENT)
Dept: PULMONOLOGY | Facility: CLINIC | Age: 60
End: 2025-03-25
Payer: COMMERCIAL

## 2025-03-25 VITALS
OXYGEN SATURATION: 96 % | SYSTOLIC BLOOD PRESSURE: 169 MMHG | HEART RATE: 72 BPM | WEIGHT: 257 LBS | DIASTOLIC BLOOD PRESSURE: 88 MMHG | HEIGHT: 63 IN | BODY MASS INDEX: 45.54 KG/M2 | TEMPERATURE: 98 F | RESPIRATION RATE: 18 BRPM

## 2025-03-25 DIAGNOSIS — E66.01 MORBID (SEVERE) OBESITY DUE TO EXCESS CALORIES: ICD-10-CM

## 2025-03-25 DIAGNOSIS — J41.8 MIXED SIMPLE AND MUCOPURULENT CHRONIC BRONCHITIS: Primary | ICD-10-CM

## 2025-03-25 DIAGNOSIS — J45.40 MODERATE PERSISTENT ASTHMA, UNSPECIFIED WHETHER COMPLICATED: ICD-10-CM

## 2025-03-25 PROCEDURE — 99213 OFFICE O/P EST LOW 20 MIN: CPT | Performed by: INTERNAL MEDICINE

## 2025-03-25 RX ORDER — DOXYCYCLINE 100 MG/1
CAPSULE ORAL
COMMUNITY
Start: 2025-03-18

## 2025-03-25 NOTE — PROGRESS NOTES
Pulmonary Office Follow-up    Subjective     Ann Marie Bunch is seen today at the office for   Chief Complaint   Patient presents with    Follow-up     4 month    COPD         HPI  Ann Marie Bunch is a 60 y.o. female with a PMH significant for COPD and bronchial asthma presents for follow-up patient has been doing well and denies any significant expectoration she is taking her Spiriva along with Advair patient is trying to lose some weight she denies any chest pain fever or hemoptysis      Tobacco use history:  Former smoker      Patient Active Problem List   Diagnosis    Aftercare following right knee arthroscopy with partial medial menisectomy, chondroplasty and removal of loose bodies    Primary osteoarthritis of right knee    Left renal mass    S/p nephrectomy    Renal cancer, left    OAB (overactive bladder)       Review of Systems  Review of Systems   Respiratory:  Positive for shortness of breath.    All other systems reviewed and are negative.    As described in the HPI. Otherwise, remainder of ROS (14 systems) were negative.    Medications, Allergies, Social, and Family Histories reviewed as per EMR.    Result Review :            Objective     Vitals:    03/25/25 0842   BP: 169/88   Pulse: 72   Resp: 18   Temp: 98 °F (36.7 °C)   SpO2: 96%         03/25/25  0842   Weight: 117 kg (257 lb)       Physical Exam  Vitals and nursing note reviewed.   Constitutional:       Appearance: Normal appearance.   HENT:      Head: Normocephalic and atraumatic.      Nose: Nose normal.      Mouth/Throat:      Mouth: Mucous membranes are moist.      Pharynx: Oropharynx is clear.   Eyes:      Extraocular Movements: Extraocular movements intact.      Conjunctiva/sclera: Conjunctivae normal.      Pupils: Pupils are equal, round, and reactive to light.   Cardiovascular:      Rate and Rhythm: Normal rate and regular rhythm.      Pulses: Normal pulses.      Heart sounds: Normal heart sounds.   Pulmonary:      Effort: Pulmonary  Current patient location: work     Is the patient currently in the state of [place]? YES    Visit mode: VIDEO    If the visit is dropped, the patient can be reconnected by:VIDEO VISIT: Text to cell phone:   Telephone Information:   Mobile [phone]       Will anyone else be joining the visit? NO  (If patient encounters technical issues they should call [phone])    Are changes needed to the allergy or medication list? Pt stated no changes to allergies and Pt stated no med changes    Are refills needed on medications prescribed by this physician? NO    Rooming Documentation:  Questionnaire(s) completed      Reason for visit: Consult    Wt other than 24 hrs:  3/12  Pain more than one location:  7 joints  Cristiane RIZVIF              effort is normal.      Breath sounds: Normal breath sounds.   Abdominal:      General: Abdomen is flat.   Musculoskeletal:         General: Normal range of motion.      Cervical back: Normal range of motion and neck supple.   Skin:     General: Skin is warm.      Capillary Refill: Capillary refill takes 2 to 3 seconds.   Neurological:      Mental Status: She is alert and oriented to person, place, and time.   Psychiatric:         Mood and Affect: Mood normal.         Behavior: Behavior normal.         No radiology results for the last 90 days.     Assessment & Plan     Diagnoses and all orders for this visit:    1. Mixed simple and mucopurulent chronic bronchitis (Primary)    2. Moderate persistent asthma, unspecified whether complicated    3. Morbid (severe) obesity due to excess calories         Discussion/ Recommendations:   Patient is advised to continue her Advair twice daily  Spiriva daily  Advised to reduce weight her BMI is 45.53  Continue albuterol inhaler as needed  Vaccinations discussed and recommended    Class 3 Severe Obesity (BMI >=40). Obesity-related health conditions include the following: hypertension. Obesity is unchanged. BMI is is above average; BMI management plan is completed. We discussed low calorie, low carb based diet program, portion control, and increasing exercise.        Return in about 4 months (around 7/25/2025).          This document has been electronically signed by Jerson Mata MD on March 25, 2025 08:54 EDT

## 2025-06-18 ENCOUNTER — OFFICE VISIT (OUTPATIENT)
Dept: ORTHOPEDIC SURGERY | Facility: CLINIC | Age: 60
End: 2025-06-18
Payer: COMMERCIAL

## 2025-06-18 VITALS
HEIGHT: 63 IN | WEIGHT: 259 LBS | SYSTOLIC BLOOD PRESSURE: 121 MMHG | BODY MASS INDEX: 45.89 KG/M2 | OXYGEN SATURATION: 90 % | DIASTOLIC BLOOD PRESSURE: 75 MMHG | HEART RATE: 77 BPM

## 2025-06-18 DIAGNOSIS — M17.11 PRIMARY OSTEOARTHRITIS OF RIGHT KNEE: Primary | ICD-10-CM

## 2025-06-18 DIAGNOSIS — R60.0 BILATERAL LOWER EXTREMITY EDEMA: ICD-10-CM

## 2025-06-18 RX ORDER — LIDOCAINE HYDROCHLORIDE 10 MG/ML
5 INJECTION, SOLUTION INFILTRATION; PERINEURAL
Status: COMPLETED | OUTPATIENT
Start: 2025-06-18 | End: 2025-06-18

## 2025-06-18 RX ORDER — TRIAMCINOLONE ACETONIDE 40 MG/ML
40 INJECTION, SUSPENSION INTRA-ARTICULAR; INTRAMUSCULAR
Status: COMPLETED | OUTPATIENT
Start: 2025-06-18 | End: 2025-06-18

## 2025-06-18 RX ADMIN — LIDOCAINE HYDROCHLORIDE 5 ML: 10 INJECTION, SOLUTION INFILTRATION; PERINEURAL at 09:32

## 2025-06-18 RX ADMIN — TRIAMCINOLONE ACETONIDE 40 MG: 40 INJECTION, SUSPENSION INTRA-ARTICULAR; INTRAMUSCULAR at 09:32

## 2025-06-18 NOTE — PROGRESS NOTES
Chief Complaint  Follow-up of the Right Knee    Subjective      Ann Marie Bunch presents to [facility]     History of Present Illness  The patient is here today for a follow-up on her right knee pain and osteoarthritis. She was last seen in the office on 03/12/2025. She is a candidate for a right total knee arthroplasty; however, due to an elevated BMI, she is not currently a good candidate for surgery. She has had continued pain in her right knee. X-rays obtained today show progressive severe grade 4 right knee osteoarthritis. She reports weight loss since her last office visit.    She continues to experience pain in her right knee, particularly in the posterior aspect. She also reports persistent swelling in her legs, which she has not previously associated with lymphedema. She has been unable to use compression stockings due to difficulty in fitting them over her heel. She is seeking a work note indicating her ability to return to work and the potential for future surgery.    She has undergone extensive surgery on her foot, which she believes may be contributing to her current symptoms. She is currently on pain medication for her back pain and is receiving treatment from a pain clinic. She acknowledges that her hip, back, and knee pain are interconnected, with each exacerbating the other.    She has been managing her diet by reducing portion sizes and counting calories, incorporating protein shakes into one of her meals. She attributes her increased appetite to steroid use.    She has been receiving injections for her hip and lower back pain, which she describes as being closer to her hip. She underwent an ablation procedure on 05/01/2025 and is currently on pain medication.    She has type 2 diabetes, which is not currently being managed with medication, and she expresses concern about her healing process. She has noticed a tendency to bleed easily from minor scratches and bruises  "easily. She is not taking any blood thinners, aspirin, or Motrin, and relies on Tylenol Arthritis for pain management.      Allergies   Allergen Reactions    Motrin [Ibuprofen] Nausea And Vomiting and Swelling    Nsaids Swelling and GI Intolerance    Aspirin Nausea Only and Swelling     STATES \"TEARS HER STOMACH UP\" EVEN WITH FOOD    Adhesive Tape Other (See Comments)     Redness and bruising       Objective     Vital Signs:   Vitals:    06/18/25 0852   BP: 121/75   Pulse: 77   SpO2: 90%   Weight: 117 kg (259 lb)   Height: 160 cm (63\")     Body mass index is 45.88 kg/m².    I reviewed the patient's chief complaint, history of present illness, review of systems, past medical history, surgical history, family history, social history, medications, and allergy list.     Ortho Exam    General: Alert, no acute distress.   Right Knee: No pain with passive hip ROM.  Knee stable to varus/valgus stress.  Knee extensor mechanism intact. -5 degrees knee extension. Flexion to 125 degrees. Calf soft, non-tender.  Sensation and neurovascularly intact.  Demonstrates active ankle dorsiflexion and plantarflexion.  Palpable pedal pulses.         Large Joint Arthrocentesis: R knee  Date/Time: 6/18/2025 9:32 AM  Consent given by: patient  Site marked: site marked  Timeout: Immediately prior to procedure a time out was called to verify the correct patient, procedure, equipment, support staff and site/side marked as required   Supporting Documentation  Indications: pain   Procedure Details  Location: knee - R knee  Needle gauge: 21 G.  Medications administered: 5 mL lidocaine 1 %; 40 mg triamcinolone acetonide 40 MG/ML  Patient tolerance: patient tolerated the procedure well with no immediate complications         This injection documentation was Scribed for GINA Winchester by Prosper Hernandez.  06/18/25   09:32 EDT      Imaging Results (Most Recent)       Procedure Component Value Units Date/Time    XR Knee 3 View Right - Preliminary " [[id number]] Resulted: 06/18/25 0919     Updated: 06/18/25 0919    This result has not been signed. Information might be incomplete.      Narrative:      X-Ray Report:  Study: X-rays ordered, taken in the office, and reviewed today  Site: Right knee xray  Indication: Right knee pain  View: AP/Lateral view(s)  Findings: Severe grade 4 right knee osteoarthritis specifically in the   medial and patellofemoral joint lines  Prior studies available for comparison: yes                Results  Imaging   - X-ray of the right knee: 06/18/2025, Progressive severe grade 4 right knee osteoarthritis         Assessment and Plan   Diagnoses and all orders for this visit:    1. Primary osteoarthritis of right knee (Primary)  -     XR Knee 3 View Right    2. Bilateral lower extremity edema  -     Ambulatory Referral to Lymphedema Clinic    Other orders  -     Large Joint Arthrocentesis: R knee       Assessment & Plan  1. Right knee pain/OA/BLE edema:    Patient was informed of possible adverse effects including but not limited to bleeding, damage to nerve, tendon or artery, increased blood sugar and increased blood pressure. Discussed possibility of a reaction from the injection.  Discussed the possibility that the injection may not completely improve or remove the pain.  Discussed the risk of infection.  Discussed the possibility of worsening pain after the injection.  Informed consent obtained.  Time out was performed. Patient was placed with knee in flexion at 90 degrees. Site marked inferior and lateral to patella.  Chlorhexidine was swabbed at injection site per typical technique. Needle injected into bursa, aspiration was performed and then right knee steroid slowly injected into joint space, fluid was free flowing. Needle was removed, band-aid placed to injection site.  Patient tolerated injection well with no complications.     Referral for lymphedema management will be initiated to address leg swelling.     The patient is  advised to continue weight loss efforts and dietary modifications. A follow-up appointment with Dr. Calvillo is scheduled for the end of July 2025 to discuss potential surgical options. The patient is advised to continue weight loss and management of lymphedema to potentially qualify for future surgical intervention. The patient is advised to monitor blood glucose levels and maintain a balanced diet.    Although patient has been able to achieve weight loss her BMI is still elevated to a point where the risk of complications during surgery are high.  We discussed the complications again such as infection.  Patient verbalized understanding and will continue her diet changes.    Follow-up: A follow-up appointment with Dr. Calvillo is scheduled for the end of July 2025 to discuss if she is a good surgical candidate based on her BMI at that time.    PROCEDURE    A joint injection was administered in the right knee today.          Tobacco Use: Medium Risk (6/18/2025)    Patient History     Smoking Tobacco Use: Former     Smokeless Tobacco Use: Never     Passive Exposure: Current     Patient reports they have a history of tobacco use; encouraged continued tobacco cessation for further health benefits.            Follow Up   Return in about 7 weeks (around 8/6/2025).  There are no Patient Instructions on file for this visit.    Patient was given instructions and counseling regarding her condition or for health maintenance advice. Please see specific information pulled into the AVS if appropriate.     Patient or patient representative verbalized consent for the use of Ambient Listening during the visit with  GINA Winchester for chart documentation. 6/18/2025  13:02 EDT    Dictated Utilizing Dragon Dictation. Please note that portions of this note were completed with a voice recognition program. Part of this note may be an electronic transcription/translation of spoken language to printed text using the Dragon Dictation System.

## 2025-07-24 NOTE — PROGRESS NOTES
Primary Care Provider  Heike Buchanan APRN   Referring Provider  No ref. provider found    Patient Complaint  Follow-up (4 month) and COPD    Patient or patient representative verbalized consent for the use of Ambient Listening during the visit with  GINA Mclean for chart documentation. 7/25/2025  08:52 EDT      Subjective       History of Presenting Illness  Ann Marie Bunch is a pleasant 60 y.o. female who presents to [facility] with history of COPD here for follow-up appointment.  Patient was last seen 3/25/2025 by Dr. Mata.  Patient is a former smoker quit in 2009.  She is not eligible for low-dose lung cancer screenings.  Her last pulmonary function test was 2/29/2024 which was moderately severe airway obstruction.    History of Present Illness  The patient is a 60-year-old female who presents for a follow-up appointment.    She reports feeling slightly congested but overall not unwell. She has not used her albuterol inhaler recently. She is seeking verification of her COPD diagnosis for the social security office. She does not use oxygen, CPAP, or BiPAP. She reports no fever, chills, or coughing up blood. She has not smoked since 2009 and does not vape. She has not received the influenza vaccine as it tends to make her feel more ill.    Her blood pressure tends to fluctuate. She is not currently on any medication for this condition. She has been advised by her primary care physician to monitor her blood pressure at home.    She has sufficient nebulizer medication and uses an emergency inhaler. She uses Advair and Spiriva.    SOCIAL HISTORY  Tobacco: The patient has not picked up any smoking habits since 2009.       At present time patient denies dyspnea, coughing, wheezing, headaches, chest pain, weight loss or hemoptysis. Patient denies fevers, chills and night sweats. Ann Marie Bunch is able to perform ADLs.      I have personally reviewed  "the review of systems, past family, social, medical and surgical histories; and agree with their findings.      Review of Systems   Constitutional: Negative.    HENT: Negative.     Respiratory: Negative.     Cardiovascular: Negative.    Musculoskeletal: Negative.    Neurological: Negative.    Psychiatric/Behavioral: Negative.           Family History   Problem Relation Age of Onset    Arthritis Mother         family history of certain chronic disabling diseases     Cancer Father         unspecified    Diabetes Father         unspecified type    Cancer Sister         unspecified    Diabetes Sister         unspecified type    Malig Hyperthermia Neg Hx         Social History     Socioeconomic History    Marital status:    Tobacco Use    Smoking status: Former     Current packs/day: 0.00     Average packs/day: 1.5 packs/day for 33.0 years (49.5 ttl pk-yrs)     Types: Cigarettes     Start date:      Quit date: 2009     Years since quittin.5     Passive exposure: Current    Smokeless tobacco: Never    Tobacco comments:     10/20/2017   Vaping Use    Vaping status: Never Used   Substance and Sexual Activity    Alcohol use: Yes     Comment: occasionally drinks     Drug use: Never    Sexual activity: Defer        Past Medical History:   Diagnosis Date    Asthma     Chronic obstructive pulmonary disease     Cyst of kidney, acquired     bilateral    Diabetes     diet controlled    Hypertension     monitoring, no meds    Left kidney mass     Limb swelling     Low back pain     ddd    Seasonal allergies     allergy shots 2x weekly          There is no immunization history on file for this patient.    Allergies   Allergen Reactions    Motrin [Ibuprofen] Nausea And Vomiting and Swelling    Nsaids Swelling and GI Intolerance    Aspirin Nausea Only and Swelling     STATES \"TEARS HER STOMACH UP\" EVEN WITH FOOD    Adhesive Tape Other (See Comments)     Redness and bruising          Current Outpatient Medications: " "    acetaminophen (TYLENOL) 650 MG 8 hr tablet, Take 2 tablets by mouth Every 8 (Eight) Hours As Needed for Mild Pain., Disp: , Rfl:     albuterol (PROVENTIL) (5 MG/ML) 0.5% nebulizer solution, Take 0.3 mL by nebulization Every 6 (Six) Hours As Needed for Wheezing. SOLUTION IS 1.25 MG PER 3 ML, Disp: 200 each, Rfl: 2    albuterol sulfate  (90 Base) MCG/ACT inhaler, Inhale 2 puffs Every 4 (Four) Hours As Needed for Shortness of Air or Wheezing., Disp: 18 g, Rfl: 5    baclofen (LIORESAL) 10 MG tablet, Take 1 tablet by mouth 3 (Three) Times a Day As Needed., Disp: , Rfl:     EPINEPHrine (EPIPEN) 0.3 MG/0.3ML solution auto-injector injection, inject into thigh through clothing as needed FOR SEVERE allergic reaction -- call 911, Disp: , Rfl:     fluticasone-salmeterol (Advair HFA) 230-21 MCG/ACT inhaler, Inhale 2 puffs 2 (Two) Times a Day., Disp: 3 each, Rfl: 3    HYDROcodone-acetaminophen (NORCO) 7.5-325 MG per tablet, Take 1 tablet by mouth Every 6 (Six) Hours As Needed for Moderate Pain., Disp: , Rfl:     loratadine (CLARITIN) 10 MG tablet, Take 1 tablet by mouth Every Night. (Patient taking differently: Take 1 tablet by mouth Daily.), Disp: , Rfl:     montelukast (SINGULAIR) 10 MG tablet, Take 1 tablet by mouth Every Night., Disp: , Rfl:     naloxone (NARCAN) 4 MG/0.1ML nasal spray, Administer 1 spray into the nostril(s) as directed by provider As Needed., Disp: , Rfl:     tiotropium bromide monohydrate (SPIRIVA RESPIMAT) 2.5 MCG/ACT aerosol solution inhaler, Inhale 2 puffs Daily. 3 MONTH SUPPLY, Disp: 3 each, Rfl: 3         Vital Signs   /97 (BP Location: Left arm, Patient Position: Sitting, Cuff Size: Adult)   Pulse 75   Temp 98.4 °F (36.9 °C) (Temporal)   Resp 16   Ht 160 cm (63\")   Wt 115 kg (254 lb)   SpO2 97% Comment: room air  BMI 44.99 kg/m²       Objective     Physical Exam  Vitals reviewed.   Constitutional:       General: She is not in acute distress.     Appearance: Normal appearance. " She is obese. She is not ill-appearing.   HENT:      Head: Normocephalic and atraumatic.      Nose: Nose normal.      Mouth/Throat:      Mouth: Mucous membranes are moist.      Pharynx: Oropharynx is clear.   Eyes:      Extraocular Movements: Extraocular movements intact.      Conjunctiva/sclera: Conjunctivae normal.      Pupils: Pupils are equal, round, and reactive to light.   Cardiovascular:      Rate and Rhythm: Normal rate and regular rhythm.      Pulses: Normal pulses.      Heart sounds: Normal heart sounds.   Pulmonary:      Effort: Pulmonary effort is normal. No respiratory distress.      Breath sounds: Normal breath sounds. No stridor. No wheezing, rhonchi or rales.   Abdominal:      General: Bowel sounds are normal.   Musculoskeletal:         General: Normal range of motion.      Cervical back: Normal range of motion and neck supple.   Skin:     General: Skin is warm and dry.   Neurological:      Mental Status: She is alert and oriented to person, place, and time.   Psychiatric:         Behavior: Behavior normal.         Physical Exam  Blood Pressure: Elevated  Heart: Regular rate and rhythm  Lungs: clear bilaterally         Results Review  I have personally reviewed the prior office notes, hospital records, labs, and diagnostics.    Results  Diagnostic Testing   - Pulmonary function test: 2024, Moderately severe airway obstruction          Assessment         Patient Active Problem List   Diagnosis    Aftercare following right knee arthroscopy with partial medial menisectomy, chondroplasty and removal of loose bodies    Primary osteoarthritis of right knee    Left renal mass    S/p nephrectomy    Renal cancer, left    OAB (overactive bladder)        Plan     Diagnoses and all orders for this visit:    1. Chronic obstructive pulmonary disease, unspecified COPD type (Primary)  -     albuterol sulfate  (90 Base) MCG/ACT inhaler; Inhale 2 puffs Every 4 (Four) Hours As Needed for Shortness of Air or  Wheezing.  Dispense: 18 g; Refill: 5  -     fluticasone-salmeterol (Advair HFA) 230-21 MCG/ACT inhaler; Inhale 2 puffs 2 (Two) Times a Day.  Dispense: 3 each; Refill: 3  -     tiotropium bromide monohydrate (SPIRIVA RESPIMAT) 2.5 MCG/ACT aerosol solution inhaler; Inhale 2 puffs Daily. 3 MONTH SUPPLY  Dispense: 3 each; Refill: 3    2. Nicotine dependence, cigarettes, in remission  -     albuterol sulfate  (90 Base) MCG/ACT inhaler; Inhale 2 puffs Every 4 (Four) Hours As Needed for Shortness of Air or Wheezing.  Dispense: 18 g; Refill: 5  -     fluticasone-salmeterol (Advair HFA) 230-21 MCG/ACT inhaler; Inhale 2 puffs 2 (Two) Times a Day.  Dispense: 3 each; Refill: 3  -     tiotropium bromide monohydrate (SPIRIVA RESPIMAT) 2.5 MCG/ACT aerosol solution inhaler; Inhale 2 puffs Daily. 3 MONTH SUPPLY  Dispense: 3 each; Refill: 3         Assessment & Plan  1. Chronic Obstructive Pulmonary Disease (COPD).  Her last pulmonary function test in 2024 indicated moderately severe airway obstruction. She is not currently on oxygen, CPAP, or BiPAP. She reports no fever, chills, or hemoptysis. She has not smoked since 2009 and does not vape. She has not received the influenza vaccine as it tends to make her feel more ill. Prescriptions for albuterol inhaler, Advair, and Spiriva have been renewed and sent to the pharmacy. Advised to use albuterol when experiencing congestion. A copy of her medical records will be provided upon request. A repeat pulmonary function test may be considered during her next visit to assess lung function.    2. Elevated blood pressure.  Her blood pressure is slightly elevated today. She does not take blood pressure medication. Advised to consult her primary care physician regarding the initiation of antihypertensive therapy to prevent complications such as stroke, heart attack, blindness, and kidney disease. She should continue monitoring her blood pressure at home.    Follow-up  A follow-up visit is  scheduled in 6 months, or earlier if necessary.      Smoking status:  reports that she quit smoking about 16 years ago. Her smoking use included cigarettes. She started smoking about 49 years ago. She has a 49.5 pack-year smoking history. She has been exposed to tobacco smoke. She has never used smokeless tobacco.    Vaccination status: Reviewed    There is no immunization history on file for this patient.     Medications personally reviewed    Follow Up  Return in about 6 months (around 1/25/2026).    Patient was given instructions and counseling regarding her condition or for health maintenance advice. Please see specific information pulled into the AVS if appropriate.     I spent 15 minutes caring for Ann Marie Bunch on this date of service. This time includes time spent by me in the following activities:preparing for the visit, reviewing tests, obtaining and/or reviewing a separately obtained history, performing a medically appropriate examination and/or evaluation, counseling and educating the patient/family/caregiver, ordering medications, tests, or procedures, documenting information in the medical record, independently interpreting results and communicating that information with the patient/family/caregiver and answered questions family members, discuss medications.

## 2025-07-25 ENCOUNTER — OFFICE VISIT (OUTPATIENT)
Dept: PULMONOLOGY | Facility: CLINIC | Age: 60
End: 2025-07-25
Payer: COMMERCIAL

## 2025-07-25 VITALS
HEIGHT: 63 IN | RESPIRATION RATE: 16 BRPM | WEIGHT: 254 LBS | DIASTOLIC BLOOD PRESSURE: 97 MMHG | TEMPERATURE: 98.4 F | OXYGEN SATURATION: 97 % | HEART RATE: 75 BPM | SYSTOLIC BLOOD PRESSURE: 168 MMHG | BODY MASS INDEX: 45 KG/M2

## 2025-07-25 DIAGNOSIS — F17.211 NICOTINE DEPENDENCE, CIGARETTES, IN REMISSION: ICD-10-CM

## 2025-07-25 DIAGNOSIS — J44.9 CHRONIC OBSTRUCTIVE PULMONARY DISEASE, UNSPECIFIED COPD TYPE: Primary | ICD-10-CM

## 2025-07-25 PROCEDURE — 99214 OFFICE O/P EST MOD 30 MIN: CPT | Performed by: NURSE PRACTITIONER

## 2025-07-25 RX ORDER — ALBUTEROL SULFATE 90 UG/1
2 INHALANT RESPIRATORY (INHALATION) EVERY 4 HOURS PRN
Qty: 18 G | Refills: 5 | Status: SHIPPED | OUTPATIENT
Start: 2025-07-25

## 2025-07-25 RX ORDER — FLUTICASONE PROPIONATE AND SALMETEROL XINAFOATE 230; 21 UG/1; UG/1
2 AEROSOL, METERED RESPIRATORY (INHALATION)
Qty: 3 EACH | Refills: 3 | Status: SHIPPED | OUTPATIENT
Start: 2025-07-25

## 2025-07-25 RX ORDER — ALBUTEROL SULFATE 5 MG/ML
1.5 SOLUTION RESPIRATORY (INHALATION) EVERY 6 HOURS PRN
Qty: 360 ML | Refills: 3 | Status: CANCELLED | OUTPATIENT
Start: 2025-07-25

## 2025-07-25 RX ORDER — EPINEPHRINE 0.3 MG/.3ML
INJECTION SUBCUTANEOUS
COMMUNITY

## 2025-08-06 ENCOUNTER — HOSPITAL ENCOUNTER (OUTPATIENT)
Dept: OCCUPATIONAL THERAPY | Facility: HOSPITAL | Age: 60
Setting detail: THERAPIES SERIES
Discharge: HOME | End: 2025-08-06
Payer: COMMERCIAL

## 2025-08-06 DIAGNOSIS — J45.20 MILD INTERMITTENT ASTHMA, UNSPECIFIED WHETHER COMPLICATED: ICD-10-CM

## 2025-08-06 DIAGNOSIS — E13.9 DIABETES 1.5, MANAGED AS TYPE 2: ICD-10-CM

## 2025-08-06 DIAGNOSIS — L90.5 SCAR TISSUE: ICD-10-CM

## 2025-08-06 DIAGNOSIS — I89.0 LYMPHEDEMA: Primary | ICD-10-CM

## 2025-08-06 DIAGNOSIS — J42 CHRONIC BRONCHITIS, UNSPECIFIED CHRONIC BRONCHITIS TYPE: ICD-10-CM

## 2025-08-06 PROCEDURE — 97166 OT EVAL MOD COMPLEX 45 MIN: CPT

## 2025-08-06 PROCEDURE — 97535 SELF CARE MNGMENT TRAINING: CPT

## 2025-08-07 ENCOUNTER — HOSPITAL ENCOUNTER (OUTPATIENT)
Dept: OCCUPATIONAL THERAPY | Facility: HOSPITAL | Age: 60
Setting detail: THERAPIES SERIES
Discharge: HOME | End: 2025-08-07
Payer: COMMERCIAL

## 2025-08-07 DIAGNOSIS — E13.9 DIABETES 1.5, MANAGED AS TYPE 2: ICD-10-CM

## 2025-08-07 DIAGNOSIS — J45.20 MILD INTERMITTENT ASTHMA, UNSPECIFIED WHETHER COMPLICATED: ICD-10-CM

## 2025-08-07 DIAGNOSIS — J42 CHRONIC BRONCHITIS, UNSPECIFIED CHRONIC BRONCHITIS TYPE: ICD-10-CM

## 2025-08-07 DIAGNOSIS — L90.5 SCAR TISSUE: ICD-10-CM

## 2025-08-07 DIAGNOSIS — I89.0 LYMPHEDEMA: Primary | ICD-10-CM

## 2025-08-07 PROCEDURE — 97140 MANUAL THERAPY 1/> REGIONS: CPT

## 2025-08-07 PROCEDURE — 97535 SELF CARE MNGMENT TRAINING: CPT

## 2025-08-08 ENCOUNTER — HOSPITAL ENCOUNTER (OUTPATIENT)
Dept: OCCUPATIONAL THERAPY | Facility: HOSPITAL | Age: 60
Setting detail: THERAPIES SERIES
Discharge: HOME | End: 2025-08-08
Payer: COMMERCIAL

## 2025-08-08 DIAGNOSIS — I89.0 LYMPHEDEMA: Primary | ICD-10-CM

## 2025-08-08 DIAGNOSIS — E13.9 DIABETES 1.5, MANAGED AS TYPE 2: ICD-10-CM

## 2025-08-08 DIAGNOSIS — J45.20 MILD INTERMITTENT ASTHMA, UNSPECIFIED WHETHER COMPLICATED: ICD-10-CM

## 2025-08-08 DIAGNOSIS — J42 CHRONIC BRONCHITIS, UNSPECIFIED CHRONIC BRONCHITIS TYPE: ICD-10-CM

## 2025-08-08 DIAGNOSIS — L90.5 SCAR TISSUE: ICD-10-CM

## 2025-08-08 PROCEDURE — 97535 SELF CARE MNGMENT TRAINING: CPT

## 2025-08-08 PROCEDURE — 97140 MANUAL THERAPY 1/> REGIONS: CPT

## 2025-08-11 ENCOUNTER — HOSPITAL ENCOUNTER (OUTPATIENT)
Dept: OCCUPATIONAL THERAPY | Facility: HOSPITAL | Age: 60
Setting detail: THERAPIES SERIES
Discharge: HOME | End: 2025-08-11
Payer: COMMERCIAL

## 2025-08-11 DIAGNOSIS — L90.5 SCAR TISSUE: ICD-10-CM

## 2025-08-11 DIAGNOSIS — I89.0 LYMPHEDEMA: Primary | ICD-10-CM

## 2025-08-11 DIAGNOSIS — E13.9 DIABETES 1.5, MANAGED AS TYPE 2: ICD-10-CM

## 2025-08-11 PROCEDURE — 97535 SELF CARE MNGMENT TRAINING: CPT

## 2025-08-11 PROCEDURE — 97140 MANUAL THERAPY 1/> REGIONS: CPT

## 2025-08-13 ENCOUNTER — HOSPITAL ENCOUNTER (OUTPATIENT)
Dept: OCCUPATIONAL THERAPY | Facility: HOSPITAL | Age: 60
Setting detail: THERAPIES SERIES
Discharge: HOME | End: 2025-08-13
Payer: COMMERCIAL

## 2025-08-13 ENCOUNTER — OFFICE VISIT (OUTPATIENT)
Dept: ORTHOPEDIC SURGERY | Facility: CLINIC | Age: 60
End: 2025-08-13
Payer: COMMERCIAL

## 2025-08-13 ENCOUNTER — PREP FOR SURGERY (OUTPATIENT)
Dept: OTHER | Facility: HOSPITAL | Age: 60
End: 2025-08-13
Payer: COMMERCIAL

## 2025-08-13 VITALS
WEIGHT: 254 LBS | SYSTOLIC BLOOD PRESSURE: 135 MMHG | OXYGEN SATURATION: 93 % | HEIGHT: 63 IN | HEART RATE: 71 BPM | BODY MASS INDEX: 45 KG/M2 | DIASTOLIC BLOOD PRESSURE: 80 MMHG

## 2025-08-13 DIAGNOSIS — L90.5 SCAR TISSUE: ICD-10-CM

## 2025-08-13 DIAGNOSIS — E66.01 OBESITY, MORBID, BMI 40.0-49.9: ICD-10-CM

## 2025-08-13 DIAGNOSIS — M17.11 PRIMARY OSTEOARTHRITIS OF RIGHT KNEE: Primary | ICD-10-CM

## 2025-08-13 DIAGNOSIS — E13.9 DIABETES 1.5, MANAGED AS TYPE 2: ICD-10-CM

## 2025-08-13 DIAGNOSIS — I89.0 LYMPHEDEMA: Primary | ICD-10-CM

## 2025-08-13 PROCEDURE — 97140 MANUAL THERAPY 1/> REGIONS: CPT

## 2025-08-13 PROCEDURE — 97535 SELF CARE MNGMENT TRAINING: CPT

## 2025-08-13 RX ORDER — TRANEXAMIC ACID 10 MG/ML
1000 INJECTION, SOLUTION INTRAVENOUS ONCE
OUTPATIENT
Start: 2025-08-13 | End: 2025-08-13

## 2025-08-14 ENCOUNTER — HOSPITAL ENCOUNTER (OUTPATIENT)
Dept: OCCUPATIONAL THERAPY | Facility: HOSPITAL | Age: 60
Setting detail: THERAPIES SERIES
Discharge: HOME | End: 2025-08-14
Payer: COMMERCIAL

## 2025-08-14 DIAGNOSIS — I89.0 LYMPHEDEMA: Primary | ICD-10-CM

## 2025-08-14 DIAGNOSIS — L90.5 SCAR TISSUE: ICD-10-CM

## 2025-08-14 DIAGNOSIS — E13.9 DIABETES 1.5, MANAGED AS TYPE 2: ICD-10-CM

## 2025-08-14 DIAGNOSIS — Z47.1 AFTERCARE FOLLOWING RIGHT HIP JOINT REPLACEMENT SURGERY: Primary | ICD-10-CM

## 2025-08-14 DIAGNOSIS — Z47.1 AFTERCARE FOLLOWING RIGHT KNEE JOINT REPLACEMENT SURGERY: ICD-10-CM

## 2025-08-14 DIAGNOSIS — Z96.651 AFTERCARE FOLLOWING RIGHT KNEE JOINT REPLACEMENT SURGERY: ICD-10-CM

## 2025-08-14 DIAGNOSIS — Z96.651 AFTERCARE FOLLOWING RIGHT KNEE JOINT REPLACEMENT SURGERY: Primary | ICD-10-CM

## 2025-08-14 DIAGNOSIS — Z47.1 AFTERCARE FOLLOWING RIGHT KNEE JOINT REPLACEMENT SURGERY: Primary | ICD-10-CM

## 2025-08-14 DIAGNOSIS — J42 CHRONIC BRONCHITIS, UNSPECIFIED CHRONIC BRONCHITIS TYPE: ICD-10-CM

## 2025-08-14 DIAGNOSIS — J45.20 MILD INTERMITTENT ASTHMA, UNSPECIFIED WHETHER COMPLICATED: ICD-10-CM

## 2025-08-14 DIAGNOSIS — Z96.641 AFTERCARE FOLLOWING RIGHT HIP JOINT REPLACEMENT SURGERY: Primary | ICD-10-CM

## 2025-08-14 PROCEDURE — 97535 SELF CARE MNGMENT TRAINING: CPT

## 2025-08-14 PROCEDURE — 97140 MANUAL THERAPY 1/> REGIONS: CPT

## 2025-08-19 ENCOUNTER — HOSPITAL ENCOUNTER (OUTPATIENT)
Dept: OCCUPATIONAL THERAPY | Facility: HOSPITAL | Age: 60
Setting detail: THERAPIES SERIES
Discharge: HOME | End: 2025-08-19
Payer: COMMERCIAL

## 2025-08-19 DIAGNOSIS — E13.9 DIABETES 1.5, MANAGED AS TYPE 2: ICD-10-CM

## 2025-08-19 DIAGNOSIS — L90.5 SCAR TISSUE: ICD-10-CM

## 2025-08-19 DIAGNOSIS — I89.0 LYMPHEDEMA: Primary | ICD-10-CM

## 2025-08-19 DIAGNOSIS — J45.20 MILD INTERMITTENT ASTHMA, UNSPECIFIED WHETHER COMPLICATED: ICD-10-CM

## 2025-08-19 DIAGNOSIS — J42 CHRONIC BRONCHITIS, UNSPECIFIED CHRONIC BRONCHITIS TYPE: ICD-10-CM

## 2025-08-19 PROCEDURE — 97140 MANUAL THERAPY 1/> REGIONS: CPT

## 2025-08-19 PROCEDURE — 97535 SELF CARE MNGMENT TRAINING: CPT

## 2025-08-20 ENCOUNTER — HOSPITAL ENCOUNTER (OUTPATIENT)
Dept: OCCUPATIONAL THERAPY | Facility: HOSPITAL | Age: 60
Setting detail: THERAPIES SERIES
Discharge: HOME | End: 2025-08-20
Payer: COMMERCIAL

## 2025-08-20 DIAGNOSIS — E13.9 DIABETES 1.5, MANAGED AS TYPE 2: ICD-10-CM

## 2025-08-20 DIAGNOSIS — I89.0 LYMPHEDEMA: Primary | ICD-10-CM

## 2025-08-20 DIAGNOSIS — L90.5 SCAR TISSUE: ICD-10-CM

## 2025-08-20 PROCEDURE — 97535 SELF CARE MNGMENT TRAINING: CPT

## 2025-08-20 PROCEDURE — 97140 MANUAL THERAPY 1/> REGIONS: CPT

## 2025-08-22 ENCOUNTER — HOSPITAL ENCOUNTER (OUTPATIENT)
Dept: OCCUPATIONAL THERAPY | Facility: HOSPITAL | Age: 60
Setting detail: THERAPIES SERIES
Discharge: HOME | End: 2025-08-22
Payer: COMMERCIAL

## 2025-08-22 ENCOUNTER — TELEPHONE (OUTPATIENT)
Dept: OCCUPATIONAL THERAPY | Facility: HOSPITAL | Age: 60
End: 2025-08-22
Payer: COMMERCIAL

## 2025-08-22 DIAGNOSIS — Z47.89 AFTERCARE FOLLOWING SURGERY OF THE MUSCULOSKELETAL SYSTEM: ICD-10-CM

## 2025-08-22 DIAGNOSIS — I89.0 LYMPHEDEMA: Primary | ICD-10-CM

## 2025-08-22 DIAGNOSIS — E13.9 DIABETES 1.5, MANAGED AS TYPE 2: ICD-10-CM

## 2025-08-22 PROCEDURE — 97140 MANUAL THERAPY 1/> REGIONS: CPT

## 2025-08-22 PROCEDURE — 97535 SELF CARE MNGMENT TRAINING: CPT

## 2025-08-25 ENCOUNTER — TELEPHONE (OUTPATIENT)
Dept: OCCUPATIONAL THERAPY | Facility: HOSPITAL | Age: 60
End: 2025-08-25
Payer: COMMERCIAL

## 2025-08-25 ENCOUNTER — HOSPITAL ENCOUNTER (OUTPATIENT)
Dept: OCCUPATIONAL THERAPY | Facility: HOSPITAL | Age: 60
Setting detail: THERAPIES SERIES
Discharge: HOME | End: 2025-08-25
Payer: COMMERCIAL

## 2025-08-25 DIAGNOSIS — J45.20 MILD INTERMITTENT ASTHMA, UNSPECIFIED WHETHER COMPLICATED: ICD-10-CM

## 2025-08-25 DIAGNOSIS — L90.5 SCAR TISSUE: ICD-10-CM

## 2025-08-25 DIAGNOSIS — J42 CHRONIC BRONCHITIS, UNSPECIFIED CHRONIC BRONCHITIS TYPE: ICD-10-CM

## 2025-08-25 DIAGNOSIS — I89.0 LYMPHEDEMA: Primary | ICD-10-CM

## 2025-08-25 DIAGNOSIS — E13.9 DIABETES 1.5, MANAGED AS TYPE 2: ICD-10-CM

## 2025-08-25 PROCEDURE — 97535 SELF CARE MNGMENT TRAINING: CPT

## 2025-08-25 PROCEDURE — 97140 MANUAL THERAPY 1/> REGIONS: CPT

## 2025-08-29 ENCOUNTER — HOSPITAL ENCOUNTER (OUTPATIENT)
Dept: OCCUPATIONAL THERAPY | Facility: HOSPITAL | Age: 60
Setting detail: THERAPIES SERIES
Discharge: HOME | End: 2025-08-29
Payer: COMMERCIAL

## 2025-08-29 DIAGNOSIS — L90.5 SCAR TISSUE: ICD-10-CM

## 2025-08-29 DIAGNOSIS — J42 CHRONIC BRONCHITIS, UNSPECIFIED CHRONIC BRONCHITIS TYPE: ICD-10-CM

## 2025-08-29 DIAGNOSIS — E13.9 DIABETES 1.5, MANAGED AS TYPE 2: ICD-10-CM

## 2025-08-29 DIAGNOSIS — I89.0 LYMPHEDEMA: Primary | ICD-10-CM

## 2025-08-29 DIAGNOSIS — J45.20 MILD INTERMITTENT ASTHMA, UNSPECIFIED WHETHER COMPLICATED: ICD-10-CM

## 2025-08-29 DIAGNOSIS — Z47.89 AFTERCARE FOLLOWING SURGERY OF THE MUSCULOSKELETAL SYSTEM: ICD-10-CM

## 2025-08-29 PROCEDURE — 97535 SELF CARE MNGMENT TRAINING: CPT

## 2025-08-29 PROCEDURE — 97140 MANUAL THERAPY 1/> REGIONS: CPT

## (undated) DEVICE — NON-WOVEN ADHESIVE WOUND DRESSING: Brand: PRIMAPORE ADHESIVE WOUND DRSG 7.2*5CM

## (undated) DEVICE — SEAL

## (undated) DEVICE — GAMMEX® NON-LATEX SIZE 7.5, STERILE NEOPRENE POWDER-FREE SURGICAL GLOVE: Brand: GAMMEX

## (undated) DEVICE — ARM DRAPE

## (undated) DEVICE — LAPAROSCOPIC TROCAR SLEEVE/SINGLE USE: Brand: KII® OPTICAL ACCESS SYSTEM

## (undated) DEVICE — SOL IRR NACL 0.9PCT BT 1000ML

## (undated) DEVICE — SUT VIC PLS CTD BR 0 TIE 18IN VIL

## (undated) DEVICE — TISSUE RETRIEVAL SYSTEM: Brand: INZII RETRIEVAL SYSTEM

## (undated) DEVICE — TIP COVER ACCESSORY

## (undated) DEVICE — REDUCER: Brand: ENDOWRIST

## (undated) DEVICE — SUT MNCRYL PLS ANTIB UD 4/0 PS2 18IN

## (undated) DEVICE — TOTAL TRAY, 16FR 10ML SIL FOLEY, URN: Brand: MEDLINE

## (undated) DEVICE — THE FLOSEAL MALLEABLE TIP AND TRIMMABLE TIP ARE INTENDED FOR DELIVERY OF FLOSEAL HEMOSTATIC MATRIX.: Brand: FLOSEAL SPECIAL APPLICATOR TIPS

## (undated) DEVICE — 2, DISPOSABLE SUCTION/IRRIGATOR WITHOUT DISPOSABLE TIP: Brand: STRYKEFLOW

## (undated) DEVICE — CANNULA SEAL

## (undated) DEVICE — STERILE POLYISOPRENE POWDER-FREE SURGICAL GLOVES: Brand: PROTEXIS

## (undated) DEVICE — LAPAROSCOPIC SCISSORS: Brand: EPIX LAPAROSCOPIC SCISSORS

## (undated) DEVICE — KT ANTI FOG W/FLD AND SPNG

## (undated) DEVICE — KIDNEY SHAPE BALLOON: Brand: PDB

## (undated) DEVICE — APPL HEMOS FOR DELIVERY FLOSEAL

## (undated) DEVICE — SUT VIC 0 UR6 27IN VCP603H

## (undated) DEVICE — SUT VIC 2/0 SH 27IN

## (undated) DEVICE — APPL CHLORAPREP HI/LITE 26ML ORNG

## (undated) DEVICE — VIOLET POLYDIOXANONE POLYMER, SYNTHETIC ABSORBABLE SUTURE CLIPS: Brand: LAPRA-TY

## (undated) DEVICE — TOWEL,OR,DSP,ST,BLUE,STD,4/PK,20PK/CS: Brand: MEDLINE

## (undated) DEVICE — TROCAR: Brand: KII FIOS FIRST ENTRY

## (undated) DEVICE — SLV SCD KN/LEN ADJ EXPRSS BLENDED MD 1P/U

## (undated) DEVICE — PENCL E/S SMOKEEVAC W/TELESCP CANN

## (undated) DEVICE — DRSNG WND GZ CURAD OIL EMULSION 3X3IN STRL

## (undated) DEVICE — CARDINAL HEALTH LAP SPONGE  4 X 18 IN. PREWASHED X RAY DETECTABLE SOFTPACK: Brand: CARDINAL HEALTH

## (undated) DEVICE — SOL NACL 0.9PCT 1000ML

## (undated) DEVICE — DAVINCI-LF: Brand: MEDLINE INDUSTRIES, INC.

## (undated) DEVICE — NON-WOVEN ADHESIVE WOUND DRESSING: Brand: PRIMAPORE ADHESIVE DRESSING 10*8CM

## (undated) DEVICE — ANTIBACTERIAL VIOLET BRAIDED (POLYGLACTIN 910), SYNTHETIC ABSORBABLE SUTURE: Brand: COATED VICRYL